# Patient Record
Sex: FEMALE | Race: WHITE | NOT HISPANIC OR LATINO | Employment: OTHER | URBAN - NONMETROPOLITAN AREA
[De-identification: names, ages, dates, MRNs, and addresses within clinical notes are randomized per-mention and may not be internally consistent; named-entity substitution may affect disease eponyms.]

---

## 2017-01-20 ENCOUNTER — OFFICE VISIT (OUTPATIENT)
Dept: CARDIOLOGY | Facility: CLINIC | Age: 62
End: 2017-01-20

## 2017-01-20 VITALS
HEIGHT: 62 IN | DIASTOLIC BLOOD PRESSURE: 78 MMHG | BODY MASS INDEX: 25.32 KG/M2 | HEART RATE: 80 BPM | OXYGEN SATURATION: 98 % | SYSTOLIC BLOOD PRESSURE: 128 MMHG | WEIGHT: 137.6 LBS

## 2017-01-20 DIAGNOSIS — I25.119 CORONARY ARTERY DISEASE INVOLVING NATIVE CORONARY ARTERY OF NATIVE HEART WITH ANGINA PECTORIS (HCC): ICD-10-CM

## 2017-01-20 DIAGNOSIS — R07.2 PRECORDIAL PAIN: Primary | ICD-10-CM

## 2017-01-20 DIAGNOSIS — R94.39 ABNORMAL STRESS TEST: ICD-10-CM

## 2017-01-20 PROCEDURE — 93000 ELECTROCARDIOGRAM COMPLETE: CPT | Performed by: PHYSICIAN ASSISTANT

## 2017-01-20 PROCEDURE — 99213 OFFICE O/P EST LOW 20 MIN: CPT | Performed by: PHYSICIAN ASSISTANT

## 2017-01-20 NOTE — PROGRESS NOTES
Problem list     Subjective   Janice Disla is a 61 y.o. female     Chief Complaint   Patient presents with   • Follow-up     6 mo.   Problem List:  1.) CAD  1.1) Stenting of the RCA and LAD per cath, 3-2012  1.2) Residual 30-50% circumflex and diagonal stenosis noted at time of cath with medical management recommended  1.3) Low risk stress test in 7-2013  2.) Preserved systolic function  3.) HTN  4.) Dyslipidemia      5.) Rheumatoid arthritis       HPI  The patient presents today for routine evaluation and follow-up.  We had last seen her to review and abnormal stress test finding.  She had a stress test from November 2015 which had suggested distal anterior and anteroapical ischemia.  She was doing well at that time and we did not pursue catheterization.  Recently, she is started noticing increasing episodes of chest tightness and pain.  She has occasional referral through to the mid back.  She has occasional referral to the neck.  She has noted that her dyspnea has been rather marked as of recent.  It limits activity.  This is concerning for her because these are very similar what she had with prior stenting procedures.  She has no palpitations.  She denies dizziness or syncope.  She has no further complaints otherwise at this time.    Current Outpatient Prescriptions   Medication Sig Dispense Refill   • Adalimumab 40 MG/0.8ML Prefilled Syringe Kit Inject  under the skin.     • aspirin 81 MG tablet Take 1 tablet by mouth daily.     • Cholecalciferol (VITAMIN D-3) 1000 UNITS capsule Take 1 capsule by mouth daily.     • colesevelam (WELCHOL) 625 MG tablet Take 3 tablets by mouth 2 (two) times a day.     • CRESTOR 10 MG tablet Take 1 tablet by mouth Daily. 30 tablet 3   • cycloSPORINE (RESTASIS) 0.05 % ophthalmic emulsion Apply  to eye 2 (two) times a day. Instill 1 drop in each eye twice daily prn     • hydroxychloroquine (PLAQUENIL) 200 MG tablet Take 1 tablet by mouth 2 (two) times a day.     • naproxen  (NAPROSYN) 500 MG tablet Take 1 tablet by mouth 2 (two) times a day.     • Polyethylene Glycol 3350 (MIRALAX PO) Take  by mouth daily. Mix 1 capful (17gm) in 8 ounces of water , juice or tea and drink daily     • predniSONE (DELTASONE) 1 MG tablet Take 1 tablet by mouth daily.     • Ticagrelor (BRILINTA) 60 MG tablet Take 1 tablet by mouth 2 (two) times a day. 180 tablet 3     No current facility-administered medications for this visit.        Codeine; Penicillins; and Sulfa antibiotics    Past Medical History   Diagnosis Date   • Coronary artery disease    • Hyperlipidemia    • Hypertension    • Rheumatoid arthritis        Social History     Social History   • Marital status:      Spouse name: N/A   • Number of children: N/A   • Years of education: N/A     Occupational History   • Not on file.     Social History Main Topics   • Smoking status: Never Smoker   • Smokeless tobacco: Never Used   • Alcohol use Yes      Comment: socially   • Drug use: No   • Sexual activity: Not on file     Other Topics Concern   • Not on file     Social History Narrative       Family History   Problem Relation Age of Onset   • Other Mother      CABG   • Coronary artery disease Mother    • Hyperlipidemia Mother    • Rheum arthritis Mother    • Other Sister      CABG   • Coronary artery disease Sister    • Diabetes Sister    • Hyperlipidemia Sister    • Other Other      CABG   • Coronary artery disease Other    • Other Other      CABG   • Coronary artery disease Other    • Diabetes Other    • Hyperlipidemia Other    • Rheum arthritis Other        Review of Systems   Constitutional: Positive for fatigue.   HENT: Positive for postnasal drip, sneezing and tinnitus.    Eyes: Positive for visual disturbance (wears glasses).   Respiratory: Positive for cough and shortness of breath.    Cardiovascular: Positive for chest pain and palpitations. Negative for leg swelling.   Gastrointestinal: Negative.    Endocrine: Negative.   "  Genitourinary: Negative.    Musculoskeletal: Positive for arthralgias (rheumatoid) and myalgias.   Skin: Negative.    Allergic/Immunologic: Positive for environmental allergies.   Neurological: Positive for dizziness.   Hematological: Bruises/bleeds easily.   Psychiatric/Behavioral: Positive for sleep disturbance.       Objective   Visit Vitals   • /78 (BP Location: Left arm, Patient Position: Sitting)   • Pulse 80   • Ht 62\" (157.5 cm)   • Wt 137 lb 9.6 oz (62.4 kg)   • SpO2 98%   • BMI 25.17 kg/m2     Lab Results (most recent)     None        Physical Exam   Constitutional: She is oriented to person, place, and time. She appears well-developed and well-nourished. No distress.   HENT:   Head: Normocephalic and atraumatic.   Eyes: Conjunctivae and EOM are normal. Pupils are equal, round, and reactive to light.   Neck: Normal range of motion. Neck supple. No JVD present. No tracheal deviation present.   Cardiovascular: Normal rate, regular rhythm, normal heart sounds and intact distal pulses.    Pulmonary/Chest: Effort normal and breath sounds normal.   Abdominal: Soft. Bowel sounds are normal. She exhibits no distension and no mass. There is no tenderness. There is no rebound and no guarding.   Musculoskeletal: Normal range of motion. She exhibits no edema, tenderness or deformity.   Neurological: She is alert and oriented to person, place, and time.   Skin: Skin is warm and dry. No rash noted. No erythema. No pallor.   Psychiatric: She has a normal mood and affect. Her behavior is normal. Judgment and thought content normal.   Nursing note and vitals reviewed.        Procedure     ECG 12 Lead  Date/Time: 1/20/2017 9:35 AM  Performed by: KALIN CASTELLON  Authorized by: KALIN CASTELLON   Comments: Sinus rhythm, normal axis, no acute changes noted.               Assessment/Plan      Diagnosis Plan   1. Precordial pain  ECG 12 Lead   2. Coronary artery disease involving native coronary artery of native heart " with angina pectoris     3. Abnormal stress test       The patient is started noticing chest pain concerning for ischemia.  Her dyspnea is far worse.  I'm concerned is both represented her prior angina/anginal equivalent symptoms.  Her prior stress test indicated anterior and anteroapical ischemia as above.  We try to treat her medically and follow her clinically.  With breakthrough symptoms however I feel that she needs consideration for catheterization for more definitive evaluation of her coronary anatomy.  I will make no changes medications for now.  She's not sure she is on Brilinta 60 mg twice a day or 90 mg twice a day.  She will call me back on that.  Obviously, she will need to be on 90 mg twice a day prior to catheterization.  Further recommendations to be made post catheterization pending findings.

## 2017-01-20 NOTE — MR AVS SNAPSHOT
Janice Disla   1/20/2017 9:00 AM   Office Visit    Dept Phone:  [phone]   Encounter #:  [id number]    Provider:  LEO Bess   Department:  [facility] CARDIOLOGY                Your Full Care Plan              Today's Medication Changes          These changes are accurate as of: 1/20/17 10:13 AM.  If you have any questions, ask your nurse or doctor.               Stop taking medication(s)listed here:     cetirizine 10 MG tablet   Commonly known as:  zyrTEC   Stopped by:  LEO Bess                      Your Updated Medication List          This list is accurate as of: 1/20/17 10:13 AM.  Always use your most recent med list.                adalimumab 40 MG/0.8ML Prefilled Syringe Kit injection   Commonly known as:  HUMIRA       aspirin 81 MG tablet       colesevelam 625 MG tablet   Commonly known as:  WELCHOL       CRESTOR 10 MG tablet   Generic drug:  rosuvastatin   Take 1 tablet by mouth Daily.       cycloSPORINE 0.05 % ophthalmic emulsion   Commonly known as:  RESTASIS       hydroxychloroquine 200 MG tablet   Commonly known as:  PLAQUENIL       MIRALAX PO       naproxen 500 MG tablet   Commonly known as:  NAPROSYN       predniSONE 1 MG tablet   Commonly known as:  DELTASONE       ticagrelor 60 MG tablet tablet   Commonly known as:  BRILINTA   Take 1 tablet by mouth 2 (two) times a day.       Vitamin D-3 1000 UNITS capsule               We Performed the Following     Cardiac catheterization     ECG 12 Lead       You Were Diagnosed With        Codes Comments    Precordial pain    -  Primary ICD-10-CM: R07.2  ICD-9-CM: 786.51     Coronary artery disease involving native coronary artery of native heart with angina pectoris     ICD-10-CM: I25.119  ICD-9-CM: 414.01, 413.9     Abnormal stress test     ICD-10-CM: R94.39  ICD-9-CM: 794.39       Instructions     None    Patient Instructions History      Upcoming Appointments     Visit Type Date Time  "Department    FOLLOW UP 2017  9:00 AM [facility] [employer] Signup     [facility] [employer] allows you to send messages to your doctor, view your test results, renew your prescriptions, schedule appointments, and more. To sign up, go to [employer] and click on the Sign Up Now link in the New User? box. Enter your Executive [employer] Activation Code exactly as it appears below along with the last four digits of your Social Security Number and your Date of Birth () to complete the sign-up process. If you do not sign up before the expiration date, you must request a new code.    Executive [employer] Activation Code: [email or web address]  Expires: 2/3/2017 10:12 AM    If you have questions, you can email [email or web address] or call [phone] to talk to our [employer] staff. Remember, [employer] is NOT to be used for urgent needs. For medical emergencies, dial 911.               Other Info from Your Visit           Allergies     Codeine      Penicillins      Sulfa Antibiotics        Reason for Visit     Follow-up 6 mo.      Vital Signs     Blood Pressure Pulse Height Weight Oxygen Saturation Body Mass Index    128/78 (BP Location: Left arm, Patient Position: Sitting) 80 62\" (157.5 cm) 137 lb 9.6 oz (62.4 kg) 98% 25.17 kg/m2    Smoking Status                   Never Smoker           Problems and Diagnoses Noted     Precordial chest pain    -  Primary    Coronary artery disease involving native coronary artery of native heart with angina pectoris        Abnormal stress test          Results     ECG 12 Lead               "

## 2017-01-31 DIAGNOSIS — E78.5 DYSLIPIDEMIA: ICD-10-CM

## 2017-01-31 RX ORDER — ROSUVASTATIN CALCIUM 10 MG/1
10 TABLET, FILM COATED ORAL DAILY
Qty: 30 TABLET | Refills: 3 | Status: SHIPPED | OUTPATIENT
Start: 2017-01-31 | End: 2017-03-09 | Stop reason: SDUPTHER

## 2017-02-03 ENCOUNTER — OUTSIDE FACILITY SERVICE (OUTPATIENT)
Dept: CARDIOLOGY | Facility: CLINIC | Age: 62
End: 2017-02-03

## 2017-02-03 PROCEDURE — 93458 L HRT ARTERY/VENTRICLE ANGIO: CPT | Performed by: INTERNAL MEDICINE

## 2017-02-16 ENCOUNTER — OFFICE VISIT (OUTPATIENT)
Dept: CARDIOLOGY | Facility: CLINIC | Age: 62
End: 2017-02-16

## 2017-02-16 VITALS
DIASTOLIC BLOOD PRESSURE: 79 MMHG | WEIGHT: 139.8 LBS | HEART RATE: 80 BPM | HEIGHT: 62 IN | OXYGEN SATURATION: 100 % | SYSTOLIC BLOOD PRESSURE: 124 MMHG | BODY MASS INDEX: 25.73 KG/M2

## 2017-02-16 DIAGNOSIS — I10 ESSENTIAL HYPERTENSION: ICD-10-CM

## 2017-02-16 DIAGNOSIS — R06.02 SHORTNESS OF BREATH: ICD-10-CM

## 2017-02-16 DIAGNOSIS — I25.10 CORONARY ARTERIOSCLEROSIS IN NATIVE ARTERY: Primary | ICD-10-CM

## 2017-02-16 PROCEDURE — 99213 OFFICE O/P EST LOW 20 MIN: CPT | Performed by: PHYSICIAN ASSISTANT

## 2017-02-16 NOTE — PROGRESS NOTES
Problem list     Subjective   Janice Disla is a 61 y.o. female     Chief Complaint   Patient presents with   • Follow-up     presents as a follow up   Problem List:  1.) CAD  1.1) Stenting of the RCA and LAD per cath, 3-2012  1.2) Repeat [facility], 02/2017, in the setting of low-level symptoms and abnormal stress test.  Catheterization indicated patent stents, moderate disease of the LAD actually improved from time of catheterization in 2012, and significant ostial diagonal stenosis not felt an appropriate target percutaneously.  Medical management was recommended.  2.) Preserved systolic function  3.) HTN  4.) Dyslipidemia      5.) Rheumatoid arthritis    HPI  The patient presents back for follow-up post catheterization.  She had catheterization in February, with results as outlined above.  She continues to have a degree of chest pain and shortness of air which if anything has improved since time of catheterization.  She is scheduled to see her primary care provider soon to evaluate for noncardiac etiologies of symptoms.  The patient has no PND or orthopnea.  She relates to no symptoms of dysrhythmias.  Blood pressures have been controlled.  She is tolerating current medical regimen without complication.  She has no further complaints or complications otherwise.  She feels that she is doing very well from cardiac standpoint for the most part.    Current Outpatient Prescriptions   Medication Sig Dispense Refill   • Adalimumab 40 MG/0.8ML Prefilled Syringe Kit Inject  under the skin.     • aspirin 81 MG tablet Take 1 tablet by mouth daily.     • Cholecalciferol (VITAMIN D-3) 1000 UNITS capsule Take 1 capsule by mouth daily.     • colesevelam (WELCHOL) 625 MG tablet Take 3 tablets by mouth 2 (two) times a day.     • CRESTOR 10 MG tablet Take 1 tablet by mouth Daily. 30 tablet 3   • cycloSPORINE (RESTASIS) 0.05 % ophthalmic emulsion Apply  to eye 2 (two) times a day. Instill 1 drop in each eye twice daily prn     •  hydroxychloroquine (PLAQUENIL) 200 MG tablet Take 1 tablet by mouth 2 (two) times a day.     • naproxen (NAPROSYN) 500 MG tablet Take 1 tablet by mouth 2 (two) times a day.     • Polyethylene Glycol 3350 (MIRALAX PO) Take  by mouth daily. Mix 1 capful (17gm) in 8 ounces of water , juice or tea and drink daily     • predniSONE (DELTASONE) 1 MG tablet Take 1 tablet by mouth daily.       No current facility-administered medications for this visit.        Codeine; Penicillins; and Sulfa antibiotics    Past Medical History   Diagnosis Date   • Coronary artery disease    • Hyperlipidemia    • Hypertension    • Rheumatoid arthritis        Social History     Social History   • Marital status:      Spouse name: N/A   • Number of children: N/A   • Years of education: N/A     Occupational History   • Not on file.     Social History Main Topics   • Smoking status: Never Smoker   • Smokeless tobacco: Never Used   • Alcohol use Yes      Comment: socially   • Drug use: No   • Sexual activity: Not on file     Other Topics Concern   • Not on file     Social History Narrative       Family History   Problem Relation Age of Onset   • Other Mother      CABG   • Coronary artery disease Mother    • Hyperlipidemia Mother    • Rheum arthritis Mother    • Other Sister      CABG   • Coronary artery disease Sister    • Diabetes Sister    • Hyperlipidemia Sister    • Other Other      CABG   • Coronary artery disease Other    • Other Other      CABG   • Coronary artery disease Other    • Diabetes Other    • Hyperlipidemia Other    • Rheum arthritis Other        Review of Systems   Constitutional: Positive for fatigue.   HENT: Positive for sinus pressure and sore throat (dry mouth).    Eyes: Positive for visual disturbance.   Respiratory: Positive for shortness of breath.    Cardiovascular: Positive for chest pain. Negative for palpitations and leg swelling.   Gastrointestinal: Negative.    Endocrine: Negative.    Genitourinary: Negative.   "  Musculoskeletal: Positive for arthralgias (RA).   Skin: Negative.    Allergic/Immunologic: Positive for environmental allergies.   Neurological: Negative.    Hematological: Bruises/bleeds easily.   Psychiatric/Behavioral: The patient is nervous/anxious.        Objective   Visit Vitals   • /79 (BP Location: Left arm, Patient Position: Sitting)   • Pulse 80   • Ht 62\" (157.5 cm)   • Wt 139 lb 12.8 oz (63.4 kg)   • SpO2 100%   • BMI 25.57 kg/m2     Lab Results (most recent)     None        Physical Exam   Constitutional: She is oriented to person, place, and time. She appears well-developed and well-nourished. No distress.   HENT:   Head: Normocephalic and atraumatic.   Eyes: Conjunctivae and EOM are normal. Pupils are equal, round, and reactive to light.   Neck: Normal range of motion. Neck supple. No JVD present. No tracheal deviation present.   Cardiovascular: Normal rate, regular rhythm, normal heart sounds and intact distal pulses.    Pulmonary/Chest: Effort normal and breath sounds normal.   Abdominal: Soft. Bowel sounds are normal. She exhibits no distension and no mass. There is no tenderness. There is no rebound and no guarding.   Musculoskeletal: Normal range of motion. She exhibits no edema, tenderness or deformity.   Neurological: She is alert and oriented to person, place, and time.   Skin: Skin is warm and dry. No rash noted. No erythema. No pallor.   Psychiatric: She has a normal mood and affect. Her behavior is normal. Judgment and thought content normal.   Nursing note and vitals reviewed.        Procedure   Procedures       Assessment/Plan      Diagnosis Plan   1. Coronary arteriosclerosis in native artery     2. Essential hypertension     3. Shortness of breath       The patient seems to be doing well at this time.  Catheterization indicated stable findings, which we will continue to manage medically.  Nothing further from cardiac standpoint at this time.  She will call for any issues.  " Otherwise we'll see her back in 6 months.

## 2017-03-09 DIAGNOSIS — E78.5 DYSLIPIDEMIA: ICD-10-CM

## 2017-03-09 RX ORDER — ROSUVASTATIN CALCIUM 10 MG/1
10 TABLET, FILM COATED ORAL DAILY
Qty: 30 TABLET | Refills: 6 | Status: SHIPPED | OUTPATIENT
Start: 2017-03-09 | End: 2017-06-06 | Stop reason: SDUPTHER

## 2017-06-06 DIAGNOSIS — E78.5 DYSLIPIDEMIA: ICD-10-CM

## 2017-06-06 RX ORDER — ROSUVASTATIN CALCIUM 10 MG/1
10 TABLET, FILM COATED ORAL DAILY
Qty: 30 TABLET | Refills: 6 | Status: SHIPPED | OUTPATIENT
Start: 2017-06-06 | End: 2017-12-13 | Stop reason: SDUPTHER

## 2017-08-11 ENCOUNTER — TRANSCRIBE ORDERS (OUTPATIENT)
Dept: MAMMOGRAPHY | Facility: HOSPITAL | Age: 62
End: 2017-08-11

## 2017-08-11 DIAGNOSIS — N64.4 PAINFUL BREASTS: Primary | ICD-10-CM

## 2017-08-17 ENCOUNTER — HOSPITAL ENCOUNTER (OUTPATIENT)
Dept: ULTRASOUND IMAGING | Facility: HOSPITAL | Age: 62
Discharge: HOME | End: 2017-08-17

## 2017-08-17 ENCOUNTER — HOSPITAL ENCOUNTER (OUTPATIENT)
Dept: MAMMOGRAPHY | Facility: HOSPITAL | Age: 62
Discharge: HOME | End: 2017-08-17
Admitting: NURSE PRACTITIONER

## 2017-08-17 DIAGNOSIS — N64.4 PAINFUL BREASTS: ICD-10-CM

## 2017-08-17 PROCEDURE — 76642 ULTRASOUND BREAST LIMITED: CPT | Performed by: RADIOLOGY

## 2017-08-17 PROCEDURE — 77062 BREAST TOMOSYNTHESIS BI: CPT | Performed by: RADIOLOGY

## 2017-08-17 PROCEDURE — 76642 ULTRASOUND BREAST LIMITED: CPT

## 2017-08-17 PROCEDURE — 77066 DX MAMMO INCL CAD BI: CPT | Performed by: RADIOLOGY

## 2017-08-17 PROCEDURE — G0204 DX MAMMO INCL CAD BI: HCPCS

## 2017-08-17 PROCEDURE — G0279 TOMOSYNTHESIS, MAMMO: HCPCS

## 2017-09-01 ENCOUNTER — OFFICE VISIT (OUTPATIENT)
Dept: CARDIOLOGY | Facility: CLINIC | Age: 62
End: 2017-09-01

## 2017-09-01 VITALS
HEIGHT: 62 IN | OXYGEN SATURATION: 99 % | BODY MASS INDEX: 25.91 KG/M2 | SYSTOLIC BLOOD PRESSURE: 133 MMHG | DIASTOLIC BLOOD PRESSURE: 83 MMHG | HEART RATE: 76 BPM | WEIGHT: 140.8 LBS

## 2017-09-01 DIAGNOSIS — I25.10 CORONARY ARTERIOSCLEROSIS IN NATIVE ARTERY: Primary | ICD-10-CM

## 2017-09-01 DIAGNOSIS — R06.02 SHORTNESS OF BREATH: ICD-10-CM

## 2017-09-01 PROCEDURE — 99213 OFFICE O/P EST LOW 20 MIN: CPT | Performed by: PHYSICIAN ASSISTANT

## 2017-09-01 RX ORDER — PANTOPRAZOLE SODIUM 40 MG/1
40 TABLET, DELAYED RELEASE ORAL DAILY
Refills: 5 | COMMUNITY
Start: 2017-07-29

## 2017-09-01 NOTE — PROGRESS NOTES
Problem list     Subjective   Janice Disla is a 61 y.o. female     Chief Complaint   Patient presents with   • Follow-up     patient appears in office today for 6 month follow up ; patient denies any CP/SOB/Palpitations at this time    Problem List:  1.) CAD  1.1) Stenting of the RCA and LAD per cath, 3-2012  1.2) Repeat [facility], 02/2017, in the setting of low-level symptoms and abnormal stress test.  Catheterization indicated patent stents, moderate disease of the LAD actually improved from time of catheterization in 2012, and significant ostial diagonal stenosis not felt an appropriate target percutaneously.  Medical management was recommended.  2.) Preserved systolic function  3.) HTN  4.) Dyslipidemia      5.) Rheumatoid arthritis    HPI  The patient presents in for routine evaluation follow-up since her last appointment here, she reports that she is done very well.  She denies current chest pain.  She has stable dyspnea.  She relates to no PND orthopnea.  She reports no rhythm disturbance issues.  Blood pressures of been controlled when checked at home.  Labs are followed through her primary care provider and reported as normal by the patient.  She has no further complaints otherwise and feels that she is doing well.    Current Outpatient Prescriptions   Medication Sig Dispense Refill   • Adalimumab 40 MG/0.8ML Prefilled Syringe Kit Inject  under the skin.     • aspirin 81 MG tablet Take 81 mg by mouth Daily.     • Cholecalciferol (VITAMIN D-3) 1000 UNITS capsule Take 1 capsule by mouth daily.     • colesevelam (WELCHOL) 625 MG tablet Take 3 tablets by mouth 2 (two) times a day.     • CRESTOR 10 MG tablet Take 1 tablet by mouth Daily. 30 tablet 6   • cycloSPORINE (RESTASIS) 0.05 % ophthalmic emulsion Apply  to eye 2 (two) times a day. Instill 1 drop in each eye twice daily prn     • hydroxychloroquine (PLAQUENIL) 200 MG tablet Take 1 tablet by mouth 2 (two) times a day.     • pantoprazole (PROTONIX) 40 MG EC  tablet Take 40 mg by mouth Daily.  5   • Polyethylene Glycol 3350 (MIRALAX PO) Take  by mouth daily. Mix 1 capful (17gm) in 8 ounces of water , juice or tea and drink daily     • predniSONE (DELTASONE) 1 MG tablet Take 1 tablet by mouth daily.       No current facility-administered medications for this visit.        Codeine; Penicillins; and Sulfa antibiotics    Past Medical History:   Diagnosis Date   • Coronary artery disease    • Hyperlipidemia    • Hypertension    • Rheumatoid arthritis        Social History     Social History   • Marital status:      Spouse name: N/A   • Number of children: N/A   • Years of education: N/A     Occupational History   • Not on file.     Social History Main Topics   • Smoking status: Never Smoker   • Smokeless tobacco: Never Used   • Alcohol use Yes      Comment: socially   • Drug use: No   • Sexual activity: Not on file     Other Topics Concern   • Not on file     Social History Narrative       Family History   Problem Relation Age of Onset   • Other Mother      CABG   • Coronary artery disease Mother    • Hyperlipidemia Mother    • Rheum arthritis Mother    • Other Sister      CABG   • Coronary artery disease Sister    • Diabetes Sister    • Hyperlipidemia Sister    • Other Other      CABG   • Coronary artery disease Other    • Other Other      CABG   • Coronary artery disease Other    • Diabetes Other    • Hyperlipidemia Other    • Rheum arthritis Other        Review of Systems   Constitutional: Negative.  Negative for fatigue.   HENT: Negative.    Eyes: Positive for visual disturbance (wears glasses daily).   Respiratory: Positive for shortness of breath (on exertion only). Negative for cough, chest tightness and wheezing.    Cardiovascular: Negative.  Negative for chest pain (denies CP), palpitations (denies palpitations) and leg swelling.   Gastrointestinal: Negative.  Negative for abdominal pain, nausea and vomiting.   Endocrine: Negative.  Negative for cold  "intolerance, heat intolerance, polyphagia and polyuria.   Genitourinary: Negative.  Negative for difficulty urinating, frequency and urgency.   Musculoskeletal: Positive for arthralgias (H/O RA). Negative for back pain, myalgias, neck pain and neck stiffness.   Skin: Negative for rash and wound.   Allergic/Immunologic: Positive for environmental allergies (seasonal allergies). Negative for food allergies.   Neurological: Negative.  Negative for dizziness, weakness, light-headedness and headaches.   Hematological: Negative.  Does not bruise/bleed easily.   Psychiatric/Behavioral: Negative for agitation, confusion and sleep disturbance (denies waking up smothering). The patient is not nervous/anxious.        Objective   /83 (BP Location: Left arm, Patient Position: Sitting)  Pulse 76  Ht 62\" (157.5 cm)  Wt 140 lb 12.8 oz (63.9 kg)  SpO2 99%  BMI 25.75 kg/m2  Lab Results (most recent)     None        Physical Exam   Constitutional: She is oriented to person, place, and time. She appears well-developed and well-nourished. No distress.   HENT:   Head: Normocephalic and atraumatic.   Eyes: Conjunctivae and EOM are normal. Pupils are equal, round, and reactive to light.   Neck: Normal range of motion. Neck supple. No JVD present. No tracheal deviation present.   Cardiovascular: Normal rate, regular rhythm, normal heart sounds and intact distal pulses.    Pulmonary/Chest: Effort normal and breath sounds normal.   Abdominal: Soft. Bowel sounds are normal. She exhibits no distension and no mass. There is no tenderness. There is no rebound and no guarding.   Musculoskeletal: Normal range of motion. She exhibits no edema, tenderness or deformity.   Neurological: She is alert and oriented to person, place, and time.   Skin: Skin is warm and dry. No rash noted. No erythema. No pallor.   Psychiatric: She has a normal mood and affect. Her behavior is normal. Judgment and thought content normal.   Nursing note and vitals " reviewed.        Procedure   Procedures       Assessment/Plan      Diagnosis Plan   1. Coronary arteriosclerosis in native artery     2. Shortness of breath       The patient seems to be doing well.  We reviewed That and previous echo findings with her today.  The patient had stable anatomy by catheterization.  She had stable findings by echocardiogram.  I feel nothing further is indicated.  The patient will call to us for any problems.  Otherwise, we will see her back in 6 months.

## 2017-12-13 DIAGNOSIS — E78.5 DYSLIPIDEMIA: ICD-10-CM

## 2017-12-13 RX ORDER — ROSUVASTATIN CALCIUM 10 MG/1
10 TABLET, FILM COATED ORAL DAILY
Qty: 90 TABLET | Refills: 3 | Status: SHIPPED | OUTPATIENT
Start: 2017-12-13 | End: 2018-10-30 | Stop reason: SDUPTHER

## 2018-05-04 ENCOUNTER — OFFICE VISIT (OUTPATIENT)
Dept: CARDIOLOGY | Facility: CLINIC | Age: 63
End: 2018-05-04

## 2018-05-04 VITALS
HEART RATE: 70 BPM | HEIGHT: 62 IN | WEIGHT: 135.8 LBS | SYSTOLIC BLOOD PRESSURE: 129 MMHG | DIASTOLIC BLOOD PRESSURE: 86 MMHG | OXYGEN SATURATION: 100 % | BODY MASS INDEX: 24.99 KG/M2

## 2018-05-04 DIAGNOSIS — I25.10 CORONARY ARTERY DISEASE INVOLVING NATIVE CORONARY ARTERY OF NATIVE HEART WITHOUT ANGINA PECTORIS: Primary | ICD-10-CM

## 2018-05-04 DIAGNOSIS — R06.02 SHORTNESS OF BREATH: ICD-10-CM

## 2018-05-04 DIAGNOSIS — R00.2 PALPITATIONS: ICD-10-CM

## 2018-05-04 PROCEDURE — 99213 OFFICE O/P EST LOW 20 MIN: CPT | Performed by: PHYSICIAN ASSISTANT

## 2018-05-04 PROCEDURE — 93000 ELECTROCARDIOGRAM COMPLETE: CPT | Performed by: PHYSICIAN ASSISTANT

## 2018-05-04 NOTE — PROGRESS NOTES
Problem list     Subjective   Janice Disla is a 62 y.o. female     Chief Complaint   Patient presents with   • Follow-up     patient appears in office today for 6 month follow up    • Coronary Artery Disease   Problem List:  1.) CAD  1.1) Stenting of the RCA and LAD per cath, 3-2012  1.2) Repeat [facility], 02/2017, in the setting of low-level symptoms and abnormal stress test.  Catheterization indicated patent stents, moderate disease of the LAD actually improved from time of catheterization in 2012, and significant ostial diagonal stenosis not felt an appropriate target percutaneously.  Medical management was recommended.  2.) Preserved systolic function  3.) HTN  4.) Dyslipidemia      5.) Rheumatoid arthritis    HPI  The patient present in for routine, 6 month follow-up.  At this time, she tells me that she is doing reasonably well.  She has chronic but stable dyspnea.  She denies chest pain.  She tells me that her blood pressures are slightly elevated today but typically normal when checked at home.  The patient has chronic but very minimal palpitations.  She has no sustained dysrhythmic activity.  She denies PND orthopnea.  We again reviewed previous catheter findings as above.  We will be managing her coronary artery disease medically at this time.  She again confirms with me that she has no anginal equivalent symptoms at this time.  She feels that she is doing well from cardiac standpoint.    Current Outpatient Prescriptions   Medication Sig Dispense Refill   • Adalimumab 40 MG/0.8ML Prefilled Syringe Kit Inject 40 mg under the skin Every 14 (Fourteen) Days.     • aspirin 81 MG tablet Take 81 mg by mouth Daily.     • Cholecalciferol (VITAMIN D-3) 1000 UNITS capsule Take 1 capsule by mouth daily.     • colesevelam (WELCHOL) 625 MG tablet Take 3 tablets by mouth 2 (two) times a day.     • CRESTOR 10 MG tablet Take 1 tablet by mouth Daily. 90 tablet 3   • cycloSPORINE (RESTASIS) 0.05 % ophthalmic emulsion Apply  to  eye 2 (two) times a day. Instill 1 drop in each eye twice daily prn     • hydroxychloroquine (PLAQUENIL) 200 MG tablet Take 1 tablet by mouth 2 (two) times a day.     • pantoprazole (PROTONIX) 40 MG EC tablet Take 40 mg by mouth Daily.  5   • Polyethylene Glycol 3350 (MIRALAX PO) Take  by mouth daily. Mix 1 capful (17gm) in 8 ounces of water , juice or tea and drink daily       No current facility-administered medications for this visit.        Codeine; Penicillins; and Sulfa antibiotics    Past Medical History:   Diagnosis Date   • Coronary artery disease    • Hyperlipidemia    • Hypertension    • Rheumatoid arthritis        Social History     Social History   • Marital status:      Spouse name: N/A   • Number of children: N/A   • Years of education: N/A     Occupational History   • Not on file.     Social History Main Topics   • Smoking status: Never Smoker   • Smokeless tobacco: Never Used   • Alcohol use Yes      Comment: socially   • Drug use: No   • Sexual activity: Defer     Other Topics Concern   • Not on file     Social History Narrative   • No narrative on file       Family History   Problem Relation Age of Onset   • Other Mother      CABG   • Coronary artery disease Mother    • Hyperlipidemia Mother    • Rheum arthritis Mother    • Other Sister      CABG   • Coronary artery disease Sister    • Diabetes Sister    • Hyperlipidemia Sister    • Other Other      CABG   • Coronary artery disease Other    • Other Other      CABG   • Coronary artery disease Other    • Diabetes Other    • Hyperlipidemia Other    • Rheum arthritis Other        Review of Systems   Constitutional: Negative.  Negative for fatigue.   HENT: Positive for congestion (head congestion due to allergies), rhinorrhea (runny nose due to allergies) and sneezing (sneezing due to allergies). Negative for sore throat.    Eyes: Positive for visual disturbance (wears glasses daily).   Respiratory: Positive for shortness of breath (with  "exertion only). Negative for apnea, cough, chest tightness and wheezing.    Cardiovascular: Negative.  Negative for chest pain (denies CP), palpitations (denies palpitations) and leg swelling.   Gastrointestinal: Negative.  Negative for abdominal distention, abdominal pain, nausea and vomiting.   Endocrine: Negative.  Negative for cold intolerance, heat intolerance, polyphagia and polyuria.   Genitourinary: Negative.  Negative for difficulty urinating, frequency and urgency.   Musculoskeletal: Positive for arthralgias (H/O RA). Negative for back pain, myalgias, neck pain and neck stiffness.   Skin: Negative.  Negative for rash and wound.   Allergic/Immunologic: Positive for environmental allergies (seasonal allergies). Negative for food allergies.   Neurological: Negative.  Negative for dizziness, weakness, light-headedness and headaches.   Hematological: Negative.  Does not bruise/bleed easily.   Psychiatric/Behavioral: Negative.  Negative for agitation, confusion and sleep disturbance (denies waking up smothering/SOA). The patient is not nervous/anxious.        Objective   Vitals:    05/04/18 0907   BP: 129/86   BP Location: Left arm   Patient Position: Sitting   Pulse: 70   SpO2: 100%   Weight: 61.6 kg (135 lb 12.8 oz)   Height: 157.5 cm (62\")      /86 (BP Location: Left arm, Patient Position: Sitting)   Pulse 70   Ht 157.5 cm (62\")   Wt 61.6 kg (135 lb 12.8 oz)   SpO2 100%   BMI 24.84 kg/m²    Lab Results (most recent)     None        Physical Exam   Constitutional: She is oriented to person, place, and time. She appears well-developed and well-nourished. No distress.   HENT:   Head: Normocephalic and atraumatic.   Eyes: Conjunctivae and EOM are normal. Pupils are equal, round, and reactive to light.   Neck: Normal range of motion. Neck supple. No JVD present. No tracheal deviation present.   Cardiovascular: Normal rate, regular rhythm, normal heart sounds and intact distal pulses.    Pulmonary/Chest: " Effort normal and breath sounds normal.   Abdominal: Soft. Bowel sounds are normal. She exhibits no distension and no mass. There is no tenderness. There is no rebound and no guarding.   Musculoskeletal: Normal range of motion. She exhibits no edema, tenderness or deformity.   Neurological: She is alert and oriented to person, place, and time.   Skin: Skin is warm and dry. No rash noted. No erythema. No pallor.   Psychiatric: She has a normal mood and affect. Her behavior is normal. Judgment and thought content normal.   Nursing note and vitals reviewed.        Procedure     ECG 12 Lead  Date/Time: 5/4/2018 9:12 AM  Performed by: KALIN CASTELLON  Authorized by: KALIN CASTELLON   Comments: CAD    Sinus rhythm at 66, normal axis, minor nonspecific ST and T-wave changes, no acute changes noted.               Assessment/Plan      Diagnosis Plan   1. Coronary artery disease involving native coronary artery of native heart without angina pectoris  ECG 12 Lead   2. Shortness of breath     3. Palpitations         The patient is stable at this time.  She has nonobstructive coronary artery disease which we will be managing medically.  I will make no changes in medications at this time.  For any complications, she will call to us.  Otherwise, we will see her back in 6 months.         Patient's Body mass index is 24.84 kg/m². BMI is above normal parameters. Follow-up plan includes:  educational material and referral to primary care.             Electronically signed by:

## 2018-05-04 NOTE — PATIENT INSTRUCTIONS

## 2018-08-17 ENCOUNTER — TRANSCRIBE ORDERS (OUTPATIENT)
Dept: ADMINISTRATIVE | Facility: HOSPITAL | Age: 63
End: 2018-08-17

## 2018-08-17 DIAGNOSIS — Z12.31 VISIT FOR SCREENING MAMMOGRAM: Primary | ICD-10-CM

## 2018-08-31 ENCOUNTER — HOSPITAL ENCOUNTER (OUTPATIENT)
Dept: MAMMOGRAPHY | Facility: HOSPITAL | Age: 63
Discharge: HOME | End: 2018-08-31
Admitting: OBSTETRICS & GYNECOLOGY

## 2018-08-31 DIAGNOSIS — Z12.31 VISIT FOR SCREENING MAMMOGRAM: ICD-10-CM

## 2018-08-31 PROCEDURE — 77067 SCR MAMMO BI INCL CAD: CPT | Performed by: RADIOLOGY

## 2018-08-31 PROCEDURE — 77063 BREAST TOMOSYNTHESIS BI: CPT

## 2018-08-31 PROCEDURE — 77067 SCR MAMMO BI INCL CAD: CPT

## 2018-08-31 PROCEDURE — 77063 BREAST TOMOSYNTHESIS BI: CPT | Performed by: RADIOLOGY

## 2018-10-30 DIAGNOSIS — E78.5 DYSLIPIDEMIA: ICD-10-CM

## 2018-10-30 RX ORDER — ROSUVASTATIN CALCIUM 10 MG/1
10 TABLET, FILM COATED ORAL DAILY
Qty: 90 TABLET | Refills: 3 | Status: SHIPPED | OUTPATIENT
Start: 2018-10-30 | End: 2019-01-15 | Stop reason: SDUPTHER

## 2018-10-30 NOTE — TELEPHONE ENCOUNTER
Received refill request from [facility] for RF of pt's rosuvastatin 10mg tabs. Per chart review, pt has been seen within the last year and has follow up appt set up. Sending in RF, per protocol.

## 2018-11-09 ENCOUNTER — OFFICE VISIT (OUTPATIENT)
Dept: CARDIOLOGY | Facility: CLINIC | Age: 63
End: 2018-11-09

## 2018-11-09 VITALS
SYSTOLIC BLOOD PRESSURE: 120 MMHG | BODY MASS INDEX: 23.92 KG/M2 | HEART RATE: 85 BPM | HEIGHT: 62 IN | DIASTOLIC BLOOD PRESSURE: 81 MMHG | WEIGHT: 130 LBS | OXYGEN SATURATION: 98 %

## 2018-11-09 DIAGNOSIS — I25.10 CORONARY ARTERIOSCLEROSIS IN NATIVE ARTERY: Primary | ICD-10-CM

## 2018-11-09 DIAGNOSIS — I10 ESSENTIAL HYPERTENSION: ICD-10-CM

## 2018-11-09 DIAGNOSIS — R06.02 SHORTNESS OF BREATH: ICD-10-CM

## 2018-11-09 PROCEDURE — 93000 ELECTROCARDIOGRAM COMPLETE: CPT | Performed by: PHYSICIAN ASSISTANT

## 2018-11-09 PROCEDURE — 99213 OFFICE O/P EST LOW 20 MIN: CPT | Performed by: PHYSICIAN ASSISTANT

## 2018-11-09 NOTE — PROGRESS NOTES
Problem list     Subjective   Janice Disla is a 63 y.o. female     Chief Complaint   Patient presents with   • Follow-up     presents for 6 month f/u   • Coronary Artery Disease   Problem List:  1.) CAD  1.1) Stenting of the RCA and LAD per cath, 3-2012  1.2) Repeat [facility], 02/2017, in the setting of low-level symptoms and abnormal stress test.  Catheterization indicated patent stents, moderate disease of the LAD actually improved from time of catheterization in 2012, and significant ostial diagonal stenosis not felt an appropriate target percutaneously.  Medical management was recommended.  2.) Preserved systolic function  3.) HTN  4.) Dyslipidemia      5.) Rheumatoid arthritis    HPI  The patient presents back in today for routine, 6 month follow-up.  Since last appointment here, she is continued to do well from cardiovascular standpoint.  She had an episode of midepigastric discomfort last evening which she recognizes reflux related symptoms.  She has had no chest pain compatible with were consistent with ischemia since her last appointment here however.  She reports stable dyspnea.  She has no PND or orthopnea.  Blood pressures remained well controlled.  Last labs suggested normal lipid parameters by report.  Laboratories were remarkable otherwise.  She has no further complaints otherwise and feels that she is doing well from cardiovascular standpoint.    Current Outpatient Prescriptions   Medication Sig Dispense Refill   • Adalimumab 40 MG/0.8ML Prefilled Syringe Kit Inject 40 mg under the skin Every 14 (Fourteen) Days.     • aspirin 81 MG tablet Take 81 mg by mouth Daily.     • Cholecalciferol (VITAMIN D-3) 1000 UNITS capsule Take 1 capsule by mouth daily.     • colesevelam (WELCHOL) 625 MG tablet Take 3 tablets by mouth 2 (two) times a day.     • CRESTOR 10 MG tablet Take 1 tablet by mouth Daily. 90 tablet 3   • cycloSPORINE (RESTASIS) 0.05 % ophthalmic emulsion Apply  to eye 2 (two) times a day. Instill 1  drop in each eye twice daily prn     • hydroxychloroquine (PLAQUENIL) 200 MG tablet Take 1 tablet by mouth 2 (two) times a day.     • pantoprazole (PROTONIX) 40 MG EC tablet Take 40 mg by mouth Daily.  5   • Polyethylene Glycol 3350 (MIRALAX PO) Take  by mouth daily. Mix 1 capful (17gm) in 8 ounces of water , juice or tea and drink daily       No current facility-administered medications for this visit.        Codeine; Penicillins; and Sulfa antibiotics    Past Medical History:   Diagnosis Date   • Coronary artery disease    • Hyperlipidemia    • Hypertension    • Rheumatoid arthritis (CMS/HCC)        Social History     Social History   • Marital status:      Spouse name: N/A   • Number of children: N/A   • Years of education: N/A     Occupational History   • Not on file.     Social History Main Topics   • Smoking status: Never Smoker   • Smokeless tobacco: Never Used   • Alcohol use Yes      Comment: socially   • Drug use: No   • Sexual activity: Defer     Other Topics Concern   • Not on file     Social History Narrative   • No narrative on file       Family History   Problem Relation Age of Onset   • Other Mother         CABG   • Coronary artery disease Mother    • Hyperlipidemia Mother    • Rheum arthritis Mother    • Other Sister         CABG   • Coronary artery disease Sister    • Diabetes Sister    • Hyperlipidemia Sister    • Other Other         CABG   • Coronary artery disease Other    • Other Other         CABG   • Coronary artery disease Other    • Diabetes Other    • Hyperlipidemia Other    • Rheum arthritis Other    • Breast cancer Neg Hx    • Ovarian cancer Neg Hx        Review of Systems   Constitutional: Negative.  Negative for chills, diaphoresis, fatigue and fever.   HENT: Positive for sinus pain, sinus pressure, tinnitus and trouble swallowing. Negative for congestion, nosebleeds, postnasal drip, rhinorrhea, sneezing and sore throat.    Eyes: Positive for visual disturbance (wears glasses).  "  Respiratory: Negative.  Negative for apnea, chest tightness, shortness of breath and wheezing.    Cardiovascular: Negative.  Negative for chest pain (indigestion), palpitations and leg swelling.   Gastrointestinal: Negative.  Negative for abdominal pain, blood in stool, constipation, diarrhea, nausea and vomiting.   Endocrine: Negative.    Genitourinary: Negative for hematuria.   Musculoskeletal: Positive for arthralgias, back pain, myalgias and neck pain.   Skin: Negative.  Negative for rash and wound.   Allergic/Immunologic: Positive for environmental allergies. Negative for food allergies.   Neurological: Negative.  Negative for dizziness, syncope, weakness, light-headedness, numbness and headaches.   Hematological: Negative.  Does not bruise/bleed easily.   Psychiatric/Behavioral: Positive for sleep disturbance (insomnia ). Negative for agitation. The patient is not nervous/anxious.        Objective   Vitals:    11/09/18 0918   BP: 120/81   BP Location: Left arm   Patient Position: Sitting   Pulse: 85   SpO2: 98%   Weight: 59 kg (130 lb)   Height: 157.5 cm (62.01\")      /81 (BP Location: Left arm, Patient Position: Sitting)   Pulse 85   Ht 157.5 cm (62.01\")   Wt 59 kg (130 lb)   SpO2 98%   BMI 23.77 kg/m²    Lab Results (most recent)     None        Physical Exam   Constitutional: She is oriented to person, place, and time. She appears well-developed and well-nourished. No distress.   HENT:   Head: Normocephalic and atraumatic.   Eyes: Pupils are equal, round, and reactive to light. Conjunctivae and EOM are normal.   Neck: Normal range of motion. Neck supple. No JVD present. No tracheal deviation present.   Cardiovascular: Normal rate, regular rhythm, normal heart sounds and intact distal pulses.    Pulmonary/Chest: Effort normal and breath sounds normal.   Abdominal: Soft. Bowel sounds are normal. She exhibits no distension and no mass. There is no tenderness. There is no rebound and no guarding. "   Musculoskeletal: Normal range of motion. She exhibits no edema, tenderness or deformity.   Neurological: She is alert and oriented to person, place, and time.   Skin: Skin is warm and dry. No rash noted. No erythema. No pallor.   Psychiatric: She has a normal mood and affect. Her behavior is normal. Judgment and thought content normal.   Nursing note and vitals reviewed.        Procedure     ECG 12 Lead  Date/Time: 11/30/2018 1:30 PM  Performed by: Sukumar Shepherd PA  Authorized by: Sukumar Shepherd PA   Comments: Sinus rhythm, short AK interval, minor nonspecific ST and T-wave changes, no acute changes noted.               Assessment/Plan      Diagnosis Plan   1. Coronary arteriosclerosis in native artery     2. Essential hypertension     3. Shortness of breath         The patient is stable at this time.  I will make no changes.  We can see her back in 6-12 months, sooner if indicated by course.  She will call for any complications prior to follow-up.              Patient's Body mass index is 23.77 kg/m². BMI is within normal parameters. No follow-up required.             Electronically signed by:

## 2018-11-14 ENCOUNTER — TELEPHONE (OUTPATIENT)
Dept: CARDIOLOGY | Facility: CLINIC | Age: 63
End: 2018-11-14

## 2018-11-14 NOTE — TELEPHONE ENCOUNTER
Fax received from [facility] for PA request on Crestor 10mg. Contacted pharmacy and was advised patient did  90 day supply of rosuvastatin without PA being required. Jihan Dixon MA

## 2019-01-15 DIAGNOSIS — E78.5 DYSLIPIDEMIA: ICD-10-CM

## 2019-01-15 RX ORDER — ROSUVASTATIN CALCIUM 10 MG/1
10 TABLET, FILM COATED ORAL DAILY
Qty: 90 TABLET | Refills: 3 | Status: SHIPPED | OUTPATIENT
Start: 2019-01-15 | End: 2019-01-28

## 2019-01-28 ENCOUNTER — TELEPHONE (OUTPATIENT)
Dept: CARDIOLOGY | Facility: CLINIC | Age: 64
End: 2019-01-28

## 2019-01-28 DIAGNOSIS — E78.5 DYSLIPIDEMIA: ICD-10-CM

## 2019-01-28 RX ORDER — ROSUVASTATIN CALCIUM 10 MG/1
10 TABLET, COATED ORAL DAILY
Qty: 14 TABLET | Refills: 0 | Status: SHIPPED | OUTPATIENT
Start: 2019-01-28 | End: 2020-03-09

## 2019-01-28 RX ORDER — ROSUVASTATIN CALCIUM 10 MG/1
10 TABLET, COATED ORAL DAILY
Qty: 90 TABLET | Refills: 3 | Status: SHIPPED | OUTPATIENT
Start: 2019-01-28 | End: 2019-01-28 | Stop reason: SDUPTHER

## 2019-01-28 NOTE — TELEPHONE ENCOUNTER
Called received on nurse line this AM from Nettie at [facility]. [phone] ext : [phone] req RF be sent into pharmacy for generic Crestor as well as 14 day supply to local [facility]. -BH;LOLISA

## 2019-03-01 ENCOUNTER — TELEPHONE (OUTPATIENT)
Dept: CARDIOLOGY | Facility: CLINIC | Age: 64
End: 2019-03-01

## 2019-03-01 NOTE — TELEPHONE ENCOUNTER
Pt reporting BP concerns.  She states she would like noted in her chart.  She is currently not taking any BP meds and states she has not been diagnosed with BP issues.  142/93;126/85;124/85;135/91;  Charted, Pt denies CP or SOB.  States her BP is higher than normal for her.  KH,CMA

## 2019-08-13 ENCOUNTER — TRANSCRIBE ORDERS (OUTPATIENT)
Dept: ADMINISTRATIVE | Facility: HOSPITAL | Age: 64
End: 2019-08-13

## 2019-08-13 DIAGNOSIS — Z12.31 VISIT FOR SCREENING MAMMOGRAM: Primary | ICD-10-CM

## 2019-09-20 ENCOUNTER — TELEPHONE (OUTPATIENT)
Dept: CARDIOLOGY | Facility: CLINIC | Age: 64
End: 2019-09-20

## 2019-09-20 NOTE — TELEPHONE ENCOUNTER
"THE PM CALLED BOTH NUMBERS IN THE PT'S CHART AND LEFT A VM, REGARDING A VM LEFT ON THE TRIAGE LINE.  THE PM ALSO CALLED THE PT'S EMERGENCY CONTACT, WHICH HAS \"CALLING RESTRICTIONS.\"  Pt LVM 2 voicemails c/o angina, shoulder, and neck pain. She requested a call back from a nurse, as she would like to come to the office today to get checked.     Callback # [phone]   "

## 2019-09-24 ENCOUNTER — OFFICE VISIT (OUTPATIENT)
Dept: CARDIOLOGY | Facility: CLINIC | Age: 64
End: 2019-09-24

## 2019-09-24 VITALS
OXYGEN SATURATION: 99 % | HEIGHT: 62 IN | HEART RATE: 67 BPM | DIASTOLIC BLOOD PRESSURE: 90 MMHG | BODY MASS INDEX: 24.37 KG/M2 | WEIGHT: 132.4 LBS | SYSTOLIC BLOOD PRESSURE: 149 MMHG

## 2019-09-24 DIAGNOSIS — I25.10 CORONARY ARTERIOSCLEROSIS IN NATIVE ARTERY: ICD-10-CM

## 2019-09-24 DIAGNOSIS — R00.2 PALPITATIONS: ICD-10-CM

## 2019-09-24 DIAGNOSIS — I10 ESSENTIAL HYPERTENSION: ICD-10-CM

## 2019-09-24 DIAGNOSIS — R07.2 PRECORDIAL PAIN: Primary | ICD-10-CM

## 2019-09-24 PROCEDURE — 99214 OFFICE O/P EST MOD 30 MIN: CPT | Performed by: PHYSICIAN ASSISTANT

## 2019-09-24 PROCEDURE — 93000 ELECTROCARDIOGRAM COMPLETE: CPT | Performed by: PHYSICIAN ASSISTANT

## 2019-09-24 NOTE — PROGRESS NOTES
Problem list     Subjective   Janice Disla is a 64 y.o. female     Chief Complaint   Patient presents with   • Coronary Artery Disease   Problem List:  1.) CAD  1.1) Stenting of the RCA and LAD per cath, 3-2012  1.2) Repeat [facility], 02/2017, in the setting of low-level symptoms and abnormal stress test.  Catheterization indicated patent stents, moderate disease of the LAD actually improved from time of catheterization in 2012, and significant ostial diagonal stenosis not felt an appropriate target percutaneously.  Medical management was recommended.  2.) Preserved systolic function  3.) HTN  4.) Dyslipidemia      5.) Rheumatoid arthritis    HPI  The patient presents back today for evaluation at her request.  She presents in the setting of chest discomfort and palpitations noted recently.  She describes episodes of chest discomfort as more of a sharp fleeting sensation from the lower left chest up to the neck area.  She is also have mild association to the left neck into the left upper extremity as well.  The symptoms would last typically 30 to 60 seconds and then resolved.  She did go to her primary care provider and eventually was found to have pharyngitis, with strep a serology being positive.  She was given Rocephin at that time.  Eventually, pharyngitis previously described improved.  She still had mild continued chest discomfort at that time.  At that time, she now presents for consideration of further evaluation.  She does feel that her chest pain is lessened at this time.  She has had no further neck or left upper extremity discomfort.  She has ongoing dyspnea.  She has had no PND or orthopnea.  She has had no diaphoresis or nausea with the above symptoms.  She is also noted recent episodes of palpitations.  She describes more of a quivering sensation which again lasted 30 seconds or less.  She will have no associated dizziness and certainly no syncope at that time.  This tends to occur separately than her  chest discomfort.  Most the time, her palpitations occur at night.  This can either wake her up or occur just right before she goes to sleep.  She does feel that her palpitations are persistent but her chest pain seems improved.  She has no further complaints otherwise at this time.    Current Outpatient Medications   Medication Sig Dispense Refill   • Adalimumab 40 MG/0.8ML Prefilled Syringe Kit Inject 40 mg under the skin Every 14 (Fourteen) Days.     • aspirin 81 MG tablet Take 81 mg by mouth Daily.     • BIOTIN PO Take 100 mg by mouth Daily.     • Cholecalciferol (VITAMIN D-3) 1000 UNITS capsule Take 1 capsule by mouth daily.     • colesevelam (WELCHOL) 625 MG tablet Take 3 tablets by mouth 2 (two) times a day.     • cycloSPORINE (RESTASIS) 0.05 % ophthalmic emulsion Apply  to eye 2 (two) times a day. Instill 1 drop in each eye twice daily prn     • hydroxychloroquine (PLAQUENIL) 200 MG tablet Take 1 tablet by mouth 2 (two) times a day.     • pantoprazole (PROTONIX) 40 MG EC tablet Take 40 mg by mouth Daily.  5   • Polyethylene Glycol 3350 (MIRALAX PO) Take  by mouth daily. Mix 1 capful (17gm) in 8 ounces of water , juice or tea and drink daily     • rosuvastatin (CRESTOR) 10 MG tablet Take 1 tablet by mouth Daily. 14 tablet 0     No current facility-administered medications for this visit.        Codeine; Penicillins; and Sulfa antibiotics    Past Medical History:   Diagnosis Date   • Coronary artery disease    • Hyperlipidemia    • Hypertension    • Rheumatoid arthritis (CMS/[facility])        Social History     Socioeconomic History   • Marital status:      Spouse name: Not on file   • Number of children: Not on file   • Years of education: Not on file   • Highest education level: Not on file   Tobacco Use   • Smoking status: Never Smoker   • Smokeless tobacco: Never Used   Substance and Sexual Activity   • Alcohol use: Yes     Comment: socially   • Drug use: No   • Sexual activity: Defer       Family History    Problem Relation Age of Onset   • Other Mother         CABG   • Coronary artery disease Mother    • Hyperlipidemia Mother    • Rheum arthritis Mother    • Other Sister         CABG   • Coronary artery disease Sister    • Diabetes Sister    • Hyperlipidemia Sister    • Other Other         CABG   • Coronary artery disease Other    • Other Other         CABG   • Coronary artery disease Other    • Diabetes Other    • Hyperlipidemia Other    • Rheum arthritis Other    • Breast cancer Neg Hx    • Ovarian cancer Neg Hx        Review of Systems   Constitutional: Negative.  Negative for fatigue.   HENT: Positive for congestion (nasal congestion). Negative for rhinorrhea and sneezing.    Eyes: Positive for visual disturbance (wears glasses daily).   Respiratory: Positive for cough (cough from sinus drainage). Negative for chest tightness, shortness of breath and wheezing.    Cardiovascular: Positive for chest pain (precordial pain) and palpitations (increased episodes of palpitaitons/flutters). Negative for leg swelling.   Gastrointestinal: Negative.  Negative for abdominal pain, nausea and vomiting.   Endocrine: Negative.  Negative for cold intolerance and heat intolerance.   Genitourinary: Negative.  Negative for difficulty urinating, frequency and urgency.   Musculoskeletal: Positive for arthralgias (arthritis). Negative for back pain, neck pain and neck stiffness.   Skin: Negative.  Negative for rash and wound.   Allergic/Immunologic: Positive for environmental allergies (mold and cats). Negative for food allergies.   Neurological: Negative.  Negative for dizziness, syncope, weakness and light-headedness.   Hematological: Bruises/bleeds easily (bruises and bleeds easily).   Psychiatric/Behavioral: Negative for agitation, confusion and sleep disturbance (denies waking up smothering/SOA). The patient is nervous/anxious (easily nervous/anxious).        Objective   Vitals:    09/24/19 0928   BP: 149/90   BP Location: Left  "arm   Patient Position: Sitting   Pulse: 67   SpO2: 99%   Weight: 60.1 kg (132 lb 6.4 oz)   Height: 157.5 cm (62\")      /90 (BP Location: Left arm, Patient Position: Sitting)   Pulse 67   Ht 157.5 cm (62\")   Wt 60.1 kg (132 lb 6.4 oz)   SpO2 99%   BMI 24.22 kg/m²    Lab Results (most recent)     None        Physical Exam   Constitutional: She is oriented to person, place, and time. She appears well-developed and well-nourished. No distress.   HENT:   Head: Normocephalic and atraumatic.   Eyes: Conjunctivae and EOM are normal. Pupils are equal, round, and reactive to light.   Neck: Normal range of motion. Neck supple. No JVD present. No tracheal deviation present.   Cardiovascular: Normal rate, regular rhythm, normal heart sounds and intact distal pulses.   Pulmonary/Chest: Effort normal and breath sounds normal.   Abdominal: Soft. Bowel sounds are normal. She exhibits no distension and no mass. There is no tenderness. There is no rebound and no guarding.   Musculoskeletal: Normal range of motion. She exhibits no edema, tenderness or deformity.   Neurological: She is alert and oriented to person, place, and time.   Skin: Skin is warm and dry. No rash noted. No erythema. No pallor.   Psychiatric: She has a normal mood and affect. Her behavior is normal. Judgment and thought content normal.   Nursing note and vitals reviewed.        Procedure     ECG 12 Lead  Date/Time: 9/24/2019 9:31 AM  Performed by: Sukumar Shepherd PA  Authorized by: Sukumar Shepherd PA   Comparison: compared with previous ECG from 11/12/2018  Comparison to previous ECG: Sinus rhythm at 56, normal axis, no acute changes noted.  Comments: Chest pain               Assessment/Plan      Diagnosis Plan   1. Precordial pain  ECG 12 Lead    Adult Transthoracic Echo Complete W/ Cont if Necessary Per Protocol    Cardiac Event Monitor    Stress Test With Myocardial Perfusion One Day   2. Palpitations  Adult Transthoracic Echo Complete W/ Cont if " Necessary Per Protocol    Cardiac Event Monitor    Stress Test With Myocardial Perfusion One Day   3. Coronary arteriosclerosis in native artery  Adult Transthoracic Echo Complete W/ Cont if Necessary Per Protocol    Cardiac Event Monitor    Stress Test With Myocardial Perfusion One Day   4. Essential hypertension  Adult Transthoracic Echo Complete W/ Cont if Necessary Per Protocol    Cardiac Event Monitor    Stress Test With Myocardial Perfusion One Day     1.  With symptoms of chest discomfort and known coronary artery disease, I feel she needs repeat evaluation for ischemia and for a stratification otherwise.  I will schedule for Lexiscan nuclear stress test.  With RA, the patient would have difficulties with treadmill protocol and will be scheduled for Lexiscan.    2.  Also with symptoms as outlined above, I feel she needs repeat evaluation with an echocardiogram.  We will schedule for the echo to evaluate LV size and function, valvular morphologies, pericardium, and for right-sided parameters otherwise.    3.  With palpitations, I would like to schedule for an event monitor to evaluate for dysrhythmic substrates.    4.  For now, I would continue medications without change.  It appears that her blood pressures are fairly well controlled.  She will monitor that and call us for complications.  We will make no changes at this time otherwise.  We will see her back after the above and recommended further at that time.  She will call for any issues prior to follow-up.           Patient's Body mass index is 24.22 kg/m². BMI is within normal parameters. No follow-up required..             Electronically signed by:

## 2019-10-11 ENCOUNTER — APPOINTMENT (OUTPATIENT)
Dept: MAMMOGRAPHY | Facility: HOSPITAL | Age: 64
End: 2019-10-11

## 2019-10-16 ENCOUNTER — APPOINTMENT (OUTPATIENT)
Dept: CARDIOLOGY | Facility: HOSPITAL | Age: 64
End: 2019-10-16

## 2019-10-16 ENCOUNTER — HOSPITAL ENCOUNTER (OUTPATIENT)
Dept: CARDIOLOGY | Facility: HOSPITAL | Age: 64
Discharge: HOME | End: 2019-10-16

## 2019-10-16 VITALS — HEIGHT: 62 IN | BODY MASS INDEX: 24.38 KG/M2 | WEIGHT: 132.5 LBS

## 2019-10-16 DIAGNOSIS — R07.2 PRECORDIAL PAIN: ICD-10-CM

## 2019-10-16 DIAGNOSIS — R00.2 PALPITATIONS: ICD-10-CM

## 2019-10-16 DIAGNOSIS — I25.10 CORONARY ARTERIOSCLEROSIS IN NATIVE ARTERY: ICD-10-CM

## 2019-10-16 DIAGNOSIS — I10 ESSENTIAL HYPERTENSION: ICD-10-CM

## 2019-10-16 PROCEDURE — 93306 TTE W/DOPPLER COMPLETE: CPT

## 2019-10-16 PROCEDURE — 93306 TTE W/DOPPLER COMPLETE: CPT | Performed by: INTERNAL MEDICINE

## 2019-10-16 PROCEDURE — A9500 TC99M SESTAMIBI: HCPCS | Performed by: INTERNAL MEDICINE

## 2019-10-16 PROCEDURE — 78452 HT MUSCLE IMAGE SPECT MULT: CPT

## 2019-10-16 PROCEDURE — 78452 HT MUSCLE IMAGE SPECT MULT: CPT | Performed by: INTERNAL MEDICINE

## 2019-10-16 PROCEDURE — 93017 CV STRESS TEST TRACING ONLY: CPT

## 2019-10-16 PROCEDURE — 0 TECHNETIUM SESTAMIBI: Performed by: INTERNAL MEDICINE

## 2019-10-16 PROCEDURE — 25010000002 REGADENOSON 0.4 MG/5ML SOLUTION: Performed by: INTERNAL MEDICINE

## 2019-10-16 PROCEDURE — 93018 CV STRESS TEST I&R ONLY: CPT | Performed by: INTERNAL MEDICINE

## 2019-10-16 RX ADMIN — TECHNETIUM TC 99M SESTAMIBI 1 DOSE: 1 INJECTION INTRAVENOUS at 14:25

## 2019-10-16 RX ADMIN — TECHNETIUM TC 99M SESTAMIBI 1 DOSE: 1 INJECTION INTRAVENOUS at 14:26

## 2019-10-16 RX ADMIN — REGADENOSON 0.4 MG: 0.08 INJECTION, SOLUTION INTRAVENOUS at 14:26

## 2019-10-17 LAB
BH CV STRESS COMMENTS STAGE 1: NORMAL
BH CV STRESS DOSE REGADENOSON STAGE 1: 0.4
BH CV STRESS DURATION MIN STAGE 1: 0
BH CV STRESS DURATION SEC STAGE 1: 10
BH CV STRESS PROTOCOL 1: NORMAL
BH CV STRESS RECOVERY BP: NORMAL MMHG
BH CV STRESS RECOVERY HR: 69 BPM
BH CV STRESS STAGE 1: 1
MAXIMAL PREDICTED HEART RATE: 154 BPM
PERCENT MAX PREDICTED HR: 61.04 %
STRESS BASELINE BP: NORMAL MMHG
STRESS BASELINE HR: 64 BPM
STRESS PERCENT HR: 72 %
STRESS POST PEAK BP: NORMAL MMHG
STRESS POST PEAK HR: 94 BPM
STRESS TARGET HR: 131 BPM

## 2019-10-24 LAB
BH CV ECHO MEAS - ACS: 2 CM
BH CV ECHO MEAS - AO MAX PG (FULL): 3.1 MMHG
BH CV ECHO MEAS - AO MAX PG: 6.1 MMHG
BH CV ECHO MEAS - AO MEAN PG (FULL): 2 MMHG
BH CV ECHO MEAS - AO MEAN PG: 3 MMHG
BH CV ECHO MEAS - AO ROOT AREA (BSA CORRECTED): 1.8
BH CV ECHO MEAS - AO ROOT AREA: 6.6 CM^2
BH CV ECHO MEAS - AO ROOT DIAM: 2.9 CM
BH CV ECHO MEAS - AO V2 MAX: 123 CM/SEC
BH CV ECHO MEAS - AO V2 MEAN: 76.7 CM/SEC
BH CV ECHO MEAS - AO V2 VTI: 26.5 CM
BH CV ECHO MEAS - BSA(HAYCOCK): 1.6 M^2
BH CV ECHO MEAS - BSA: 1.6 M^2
BH CV ECHO MEAS - BZI_BMI: 24.1 KILOGRAMS/M^2
BH CV ECHO MEAS - BZI_METRIC_HEIGHT: 157.5 CM
BH CV ECHO MEAS - BZI_METRIC_WEIGHT: 59.9 KG
BH CV ECHO MEAS - EDV(CUBED): 67.4 ML
BH CV ECHO MEAS - EDV(TEICH): 72.9 ML
BH CV ECHO MEAS - EF(CUBED): 74.8 %
BH CV ECHO MEAS - EF(TEICH): 67.2 %
BH CV ECHO MEAS - ESV(CUBED): 17 ML
BH CV ECHO MEAS - ESV(TEICH): 23.9 ML
BH CV ECHO MEAS - FS: 36.9 %
BH CV ECHO MEAS - IVS/LVPW: 0.98
BH CV ECHO MEAS - IVSD: 0.91 CM
BH CV ECHO MEAS - LA DIMENSION(2D): 3 CM
BH CV ECHO MEAS - LA DIMENSION: 3.1 CM
BH CV ECHO MEAS - LA/AO: 1.1
BH CV ECHO MEAS - LAT PEAK E' VEL: 7.1 CM/SEC
BH CV ECHO MEAS - LV IVRT: 0.13 SEC
BH CV ECHO MEAS - LV MASS(C)D: 116.8 GRAMS
BH CV ECHO MEAS - LV MASS(C)DI: 72.9 GRAMS/M^2
BH CV ECHO MEAS - LV MAX PG: 3 MMHG
BH CV ECHO MEAS - LV MEAN PG: 1 MMHG
BH CV ECHO MEAS - LV V1 MAX: 86.2 CM/SEC
BH CV ECHO MEAS - LV V1 MEAN: 49.3 CM/SEC
BH CV ECHO MEAS - LV V1 VTI: 22.3 CM
BH CV ECHO MEAS - LVIDD: 4.1 CM
BH CV ECHO MEAS - LVIDS: 2.6 CM
BH CV ECHO MEAS - LVPWD: 0.93 CM
BH CV ECHO MEAS - MED PEAK E' VEL: 6.6 CM/SEC
BH CV ECHO MEAS - MR MAX PG: 13.2 MMHG
BH CV ECHO MEAS - MR MAX VEL: 182 CM/SEC
BH CV ECHO MEAS - MV A MAX VEL: 58.5 CM/SEC
BH CV ECHO MEAS - MV DEC TIME: 0.3 SEC
BH CV ECHO MEAS - MV E MAX VEL: 53.7 CM/SEC
BH CV ECHO MEAS - MV E/A: 0.92
BH CV ECHO MEAS - MV MAX PG: 2.5 MMHG
BH CV ECHO MEAS - MV MEAN PG: 1 MMHG
BH CV ECHO MEAS - MV V2 MAX: 78.9 CM/SEC
BH CV ECHO MEAS - MV V2 MEAN: 51 CM/SEC
BH CV ECHO MEAS - MV V2 VTI: 28.5 CM
BH CV ECHO MEAS - PA MAX PG (FULL): 1 MMHG
BH CV ECHO MEAS - PA MAX PG: 2.6 MMHG
BH CV ECHO MEAS - PA MEAN PG (FULL): 0 MMHG
BH CV ECHO MEAS - PA MEAN PG: 1 MMHG
BH CV ECHO MEAS - PA V2 MAX: 80.3 CM/SEC
BH CV ECHO MEAS - PA V2 MEAN: 56 CM/SEC
BH CV ECHO MEAS - PA V2 VTI: 19.8 CM
BH CV ECHO MEAS - RAP SYSTOLE: 10 MMHG
BH CV ECHO MEAS - RV MAX PG: 1.6 MMHG
BH CV ECHO MEAS - RV MEAN PG: 1 MMHG
BH CV ECHO MEAS - RV V1 MAX: 62.7 CM/SEC
BH CV ECHO MEAS - RV V1 MEAN: 39.1 CM/SEC
BH CV ECHO MEAS - RV V1 VTI: 14.2 CM
BH CV ECHO MEAS - RVDD: 2.4 CM
BH CV ECHO MEAS - RVSP: 26 MMHG
BH CV ECHO MEAS - SI(AO): 109.3 ML/M^2
BH CV ECHO MEAS - SI(CUBED): 31.5 ML/M^2
BH CV ECHO MEAS - SI(TEICH): 30.6 ML/M^2
BH CV ECHO MEAS - SV(AO): 175 ML
BH CV ECHO MEAS - SV(CUBED): 50.4 ML
BH CV ECHO MEAS - SV(TEICH): 49 ML
BH CV ECHO MEAS - TR MAX VEL: 194 CM/SEC
BH CV ECHO MEASUREMENTS AVERAGE E/E' RATIO: 7.84
MAXIMAL PREDICTED HEART RATE: 154 BPM
STRESS TARGET HR: 131 BPM

## 2019-12-09 ENCOUNTER — HOSPITAL ENCOUNTER (OUTPATIENT)
Dept: MAMMOGRAPHY | Facility: HOSPITAL | Age: 64
Discharge: HOME | End: 2019-12-09
Admitting: OBSTETRICS & GYNECOLOGY

## 2019-12-09 DIAGNOSIS — Z12.31 VISIT FOR SCREENING MAMMOGRAM: ICD-10-CM

## 2019-12-09 PROCEDURE — 77067 SCR MAMMO BI INCL CAD: CPT | Performed by: RADIOLOGY

## 2019-12-09 PROCEDURE — 77063 BREAST TOMOSYNTHESIS BI: CPT | Performed by: RADIOLOGY

## 2019-12-09 PROCEDURE — 77067 SCR MAMMO BI INCL CAD: CPT

## 2019-12-09 PROCEDURE — 77063 BREAST TOMOSYNTHESIS BI: CPT

## 2020-03-09 RX ORDER — ROSUVASTATIN CALCIUM 10 MG/1
TABLET, COATED ORAL
Qty: 90 TABLET | Refills: 3 | Status: SHIPPED | OUTPATIENT
Start: 2020-03-09 | End: 2020-09-01 | Stop reason: SDUPTHER

## 2020-09-01 ENCOUNTER — OFFICE VISIT (OUTPATIENT)
Dept: CARDIOLOGY | Facility: CLINIC | Age: 65
End: 2020-09-01

## 2020-09-01 VITALS
DIASTOLIC BLOOD PRESSURE: 85 MMHG | OXYGEN SATURATION: 98 % | HEART RATE: 70 BPM | WEIGHT: 129 LBS | HEIGHT: 62 IN | TEMPERATURE: 97.1 F | BODY MASS INDEX: 23.74 KG/M2 | SYSTOLIC BLOOD PRESSURE: 126 MMHG

## 2020-09-01 DIAGNOSIS — E78.2 MIXED HYPERLIPIDEMIA: ICD-10-CM

## 2020-09-01 DIAGNOSIS — R06.02 SHORTNESS OF BREATH: ICD-10-CM

## 2020-09-01 DIAGNOSIS — I25.10 CORONARY ARTERIOSCLEROSIS IN NATIVE ARTERY: Primary | ICD-10-CM

## 2020-09-01 PROCEDURE — 99213 OFFICE O/P EST LOW 20 MIN: CPT | Performed by: PHYSICIAN ASSISTANT

## 2020-09-01 RX ORDER — CETIRIZINE HYDROCHLORIDE 10 MG/1
10 TABLET ORAL DAILY
COMMUNITY

## 2020-09-01 RX ORDER — PSEUDOEPHEDRINE HCL 30 MG
30 TABLET ORAL EVERY 4 HOURS PRN
COMMUNITY
End: 2022-09-01

## 2020-09-01 RX ORDER — ALBUTEROL SULFATE 90 UG/1
2 AEROSOL, METERED RESPIRATORY (INHALATION) EVERY 4 HOURS PRN
COMMUNITY
Start: 2020-07-10 | End: 2022-03-02

## 2020-09-01 RX ORDER — ROSUVASTATIN CALCIUM 10 MG/1
10 TABLET, COATED ORAL DAILY
Qty: 90 TABLET | Refills: 3 | Status: SHIPPED | OUTPATIENT
Start: 2020-09-01 | End: 2021-06-30

## 2020-09-01 NOTE — PROGRESS NOTES
Problem list     Subjective   Janice Disla is a 64 y.o. female     Chief Complaint   Patient presents with   • Palpitations     presents for 6 month f/u   • Shortness of Breath   Problem List:  1.) CAD  1.1) Stenting of the RCA and LAD per cath, 3-2012  1.2) Repeat [facility], 02/2017, in the setting of low-level symptoms and abnormal stress test.  Catheterization indicated patent stents, moderate disease of the LAD actually improved from time of catheterization in 2012, and significant ostial diagonal stenosis not felt an appropriate target percutaneously.  Medical management was recommended.  2.) Preserved systolic function  3.) HTN  4.) Dyslipidemia      5.) Rheumatoid arthritis    HPI  This pleasant patient presents in today for routine evaluation and follow-up.  Since last evaluation here, the patient is continued to do well from cardiovascular standpoint.  Currently, the patient denies continued chest pain.  She has stable dyspnea.  She has palpitations, which she relates mostly to stress.  These occur predominantly at night, lasting only a few seconds and then resolving completely.  She has been normotensive when checked at home.  She denies syncope.  She does have episodes of vertigo which is fairly new onset as of recent.  She will be seeing her primary care provider for the same.  The patient is doing well otherwise and has no further complaints.    Current Outpatient Medications on File Prior to Visit   Medication Sig Dispense Refill   • Adalimumab 40 MG/0.8ML Prefilled Syringe Kit Inject 40 mg under the skin into the appropriate area as directed 1 (One) Time Per Week.     • albuterol sulfate  (90 Base) MCG/ACT inhaler Inhale 2 puffs Every 4 (Four) Hours As Needed.     • aspirin 81 MG tablet Take 81 mg by mouth Daily.     • BIOTIN PO Take 100 mg by mouth Daily.     • cetirizine (zyrTEC) 10 MG tablet Take 10 mg by mouth Daily.     • Cholecalciferol (VITAMIN D-3) 1000 UNITS capsule Take 1 capsule by mouth  daily.     • colesevelam (WELCHOL) 625 MG tablet Take 3 tablets by mouth 2 (two) times a day.     • cycloSPORINE (RESTASIS) 0.05 % ophthalmic emulsion Apply  to eye 2 (two) times a day. Instill 1 drop in each eye twice daily prn     • hydroxychloroquine (PLAQUENIL) 200 MG tablet Take 1 tablet by mouth 2 (two) times a day.     • pantoprazole (PROTONIX) 40 MG EC tablet Take 40 mg by mouth Daily.  5   • Polyethylene Glycol 3350 (MIRALAX PO) Take  by mouth daily. Mix 1 capful (17gm) in 8 ounces of water , juice or tea and drink daily     • pseudoephedrine (SUDAFED) 30 MG tablet Take 30 mg by mouth Every 4 (Four) Hours As Needed for Congestion.     • [DISCONTINUED] rosuvastatin (CRESTOR) 10 MG tablet TAKE 1 TABLET EVERY DAY 90 tablet 3     No current facility-administered medications on file prior to visit.        Codeine; Penicillins; and Sulfa antibiotics    Past Medical History:   Diagnosis Date   • Coronary artery disease    • Hyperlipidemia    • Hypertension    • Rheumatoid arthritis (CMS/[facility])        Social History     Socioeconomic History   • Marital status:      Spouse name: Not on file   • Number of children: Not on file   • Years of education: Not on file   • Highest education level: Not on file   Tobacco Use   • Smoking status: Never Smoker   • Smokeless tobacco: Never Used   Substance and Sexual Activity   • Alcohol use: Yes     Comment: socially   • Drug use: No   • Sexual activity: Defer       Family History   Problem Relation Age of Onset   • Other Mother         CABG   • Coronary artery disease Mother    • Hyperlipidemia Mother    • Rheum arthritis Mother    • Other Sister         CABG   • Coronary artery disease Sister    • Diabetes Sister    • Hyperlipidemia Sister    • Other Other         CABG   • Coronary artery disease Other    • Other Other         CABG   • Coronary artery disease Other    • Diabetes Other    • Hyperlipidemia Other    • Rheum arthritis Other    • Breast cancer Neg Hx    •  "Ovarian cancer Neg Hx        Review of Systems   Constitutional: Positive for fatigue. Negative for chills, diaphoresis and fever.   HENT: Positive for congestion.         Sinus issues   Eyes: Positive for visual disturbance.   Respiratory: Positive for shortness of breath (on exertion). Negative for apnea, cough, chest tightness and wheezing.    Cardiovascular: Positive for palpitations. Negative for chest pain and leg swelling.   Gastrointestinal: Negative.  Negative for abdominal pain, blood in stool, constipation, diarrhea, nausea and vomiting.   Endocrine: Negative.    Genitourinary: Negative.  Negative for hematuria.   Musculoskeletal: Positive for arthralgias. Negative for back pain, myalgias and neck pain.   Skin: Negative.  Negative for rash and wound.   Allergic/Immunologic: Positive for environmental allergies. Negative for food allergies.   Neurological: Positive for dizziness (when bending over or looking down) and headaches (sinus). Negative for syncope, weakness, light-headedness and numbness.   Hematological: Bruises/bleeds easily.   Psychiatric/Behavioral: Positive for sleep disturbance (insomnia). Negative for agitation. The patient is not nervous/anxious.        Objective   Vitals:    09/01/20 0920   BP: 126/85   BP Location: Left arm   Patient Position: Sitting   Pulse: 70   Temp: 97.1 °F (36.2 °C)   SpO2: 98%   Weight: 58.5 kg (129 lb)   Height: 157.5 cm (62\")      /85 (BP Location: Left arm, Patient Position: Sitting)   Pulse 70   Temp 97.1 °F (36.2 °C)   Ht 157.5 cm (62\")   Wt 58.5 kg (129 lb)   SpO2 98%   BMI 23.59 kg/m²    Lab Results (most recent)     None        Physical Exam   Constitutional: She is oriented to person, place, and time. She appears well-developed and well-nourished. No distress.   HENT:   Head: Normocephalic and atraumatic.   Eyes: Pupils are equal, round, and reactive to light. Conjunctivae and EOM are normal.   Neck: Normal range of motion. Neck supple. No " JVD present. No tracheal deviation present.   Cardiovascular: Normal rate, regular rhythm, normal heart sounds and intact distal pulses.   Pulmonary/Chest: Effort normal and breath sounds normal.   Abdominal: Soft. Bowel sounds are normal. She exhibits no distension and no mass. There is no tenderness. There is no rebound and no guarding.   Musculoskeletal: Normal range of motion. She exhibits no edema, tenderness or deformity.   Neurological: She is alert and oriented to person, place, and time.   Skin: Skin is warm and dry. No rash noted. No erythema. No pallor.   Psychiatric: She has a normal mood and affect. Her behavior is normal. Judgment and thought content normal.   Nursing note and vitals reviewed.        Procedure   Procedures       Assessment/Plan      Diagnosis Plan   1. Coronary arteriosclerosis in native artery     2. Shortness of breath     3. Mixed hyperlipidemia       1.  At this time, the patient appears to be doing well from cardiovascular standpoint.  She denies continued symptoms of chest pain, which was her complaint at last evaluation, last year.  She was subsequent scheduled for stress test and an echocardiogram at that time.  Those studies were benign.  I do not feel further work-up is warranted at this time.    2.  The patient has stable dyspnea.  She has no further cardiac symptoms of any significance otherwise.  We reviewed that she would contact us immediately for any breakthrough symptoms or complications otherwise.    3.  The patient does request refills for Crestor.  That has been sent to her pharmacy.  She will continue routine lipids and laboratories otherwise with her primary care provider.    4.  As the patient is doing well currently, nothing further for now.  We will continue to see her on 6-12 month intervals.           Janice LOUANN Disla  reports that she has never smoked. She has never used smokeless tobacco.        Patient's Body mass index is 23.59 kg/m². BMI is within normal  parameters. No follow-up required..             Electronically signed by:

## 2020-11-12 ENCOUNTER — TRANSCRIBE ORDERS (OUTPATIENT)
Dept: ADMINISTRATIVE | Facility: HOSPITAL | Age: 65
End: 2020-11-12

## 2020-11-12 DIAGNOSIS — Z12.31 VISIT FOR SCREENING MAMMOGRAM: Primary | ICD-10-CM

## 2021-03-11 ENCOUNTER — HOSPITAL ENCOUNTER (OUTPATIENT)
Dept: MAMMOGRAPHY | Facility: HOSPITAL | Age: 66
End: 2021-03-11

## 2021-05-28 ENCOUNTER — HOSPITAL ENCOUNTER (OUTPATIENT)
Dept: MAMMOGRAPHY | Facility: HOSPITAL | Age: 66
Discharge: HOME | End: 2021-05-28
Admitting: OBSTETRICS & GYNECOLOGY

## 2021-05-28 DIAGNOSIS — Z12.31 VISIT FOR SCREENING MAMMOGRAM: ICD-10-CM

## 2021-05-28 PROCEDURE — 77067 SCR MAMMO BI INCL CAD: CPT

## 2021-05-28 PROCEDURE — 77063 BREAST TOMOSYNTHESIS BI: CPT

## 2021-05-28 PROCEDURE — 77067 SCR MAMMO BI INCL CAD: CPT | Performed by: RADIOLOGY

## 2021-05-28 PROCEDURE — 77063 BREAST TOMOSYNTHESIS BI: CPT | Performed by: RADIOLOGY

## 2021-06-30 RX ORDER — ROSUVASTATIN CALCIUM 10 MG/1
TABLET, COATED ORAL
Qty: 90 TABLET | Refills: 3 | Status: SHIPPED | OUTPATIENT
Start: 2021-06-30 | End: 2022-04-20

## 2021-09-01 ENCOUNTER — OFFICE VISIT (OUTPATIENT)
Dept: CARDIOLOGY | Facility: CLINIC | Age: 66
End: 2021-09-01

## 2021-09-01 VITALS
DIASTOLIC BLOOD PRESSURE: 76 MMHG | HEART RATE: 64 BPM | HEIGHT: 62 IN | BODY MASS INDEX: 24.44 KG/M2 | OXYGEN SATURATION: 98 % | SYSTOLIC BLOOD PRESSURE: 112 MMHG | WEIGHT: 132.8 LBS

## 2021-09-01 DIAGNOSIS — R06.02 SHORTNESS OF BREATH: ICD-10-CM

## 2021-09-01 DIAGNOSIS — I10 ESSENTIAL HYPERTENSION: ICD-10-CM

## 2021-09-01 DIAGNOSIS — I25.10 CORONARY ARTERY DISEASE INVOLVING NATIVE CORONARY ARTERY OF NATIVE HEART WITHOUT ANGINA PECTORIS: Primary | ICD-10-CM

## 2021-09-01 PROCEDURE — 93000 ELECTROCARDIOGRAM COMPLETE: CPT | Performed by: PHYSICIAN ASSISTANT

## 2021-09-01 PROCEDURE — 99213 OFFICE O/P EST LOW 20 MIN: CPT | Performed by: PHYSICIAN ASSISTANT

## 2021-09-01 NOTE — PROGRESS NOTES
Problem list     Subjective   Janice Disla is a 65 y.o. female     Chief Complaint   Patient presents with   • Follow-up     1 year      Problem List:  1.) CAD  1.1) Stenting of the RCA and LAD per cath, 3-2012  1.2) Repeat [facility], 02/2017, in the setting of low-level symptoms and abnormal stress test.  Catheterization indicated patent stents, moderate disease of the LAD actually improved from time of catheterization in 2012, and significant ostial diagonal stenosis not felt an appropriate target percutaneously.  Medical management was recommended.  2.) Preserved systolic function  3.) HTN  4.) Dyslipidemia      5.) Rheumatoid arthritis     HPI  The patient presents back to the clinic today for routine evaluation and follow-up.  Since last evaluation, the patient is continued to do well from cardiovascular standpoint.  Her biggest issue at this time is with diffuse arthritic complications.  She currently denies chest pain.  She has stable dyspnea.  She has no failure or dysrhythmic symptoms.  Blood pressures continue to be well controlled.  She tells me that laboratories are followed with her primary care provider and have been mostly normal as of recent, in particular lipid parameters are felt to be normal.  The patient has no further complaints otherwise and feels that she is doing well from general cardiovascular standpoint.    Current Outpatient Medications on File Prior to Visit   Medication Sig Dispense Refill   • Adalimumab 40 MG/0.8ML Prefilled Syringe Kit Inject 40 mg under the skin into the appropriate area as directed 1 (One) Time Per Week.     • aspirin 81 MG tablet Take 81 mg by mouth Daily.     • BIOTIN PO Take 100 mg by mouth Daily.     • cetirizine (zyrTEC) 10 MG tablet Take 10 mg by mouth Daily.     • Cholecalciferol (VITAMIN D-3) 1000 UNITS capsule Take 1 capsule by mouth daily.     • colesevelam (WELCHOL) 625 MG tablet Take 3 tablets by mouth 2 (two) times a day.     • cycloSPORINE (RESTASIS) 0.05  % ophthalmic emulsion Apply  to eye 2 (two) times a day. Instill 1 drop in each eye twice daily prn     • hydroxychloroquine (PLAQUENIL) 200 MG tablet Take 1 tablet by mouth 2 (two) times a day.     • Misc Natural Products (NEURIVA PO) Take  by mouth.     • Multiple Vitamins-Minerals (ZINC PO) Take  by mouth.     • pantoprazole (PROTONIX) 40 MG EC tablet Take 40 mg by mouth Daily.  5   • Polyethylene Glycol 3350 (MIRALAX PO) Take  by mouth daily. Mix 1 capful (17gm) in 8 ounces of water , juice or tea and drink daily     • pseudoephedrine (SUDAFED) 30 MG tablet Take 30 mg by mouth Every 4 (Four) Hours As Needed for Congestion.     • rosuvastatin (CRESTOR) 10 MG tablet TAKE 1 TABLET EVERY DAY 90 tablet 3   • albuterol sulfate  (90 Base) MCG/ACT inhaler Inhale 2 puffs Every 4 (Four) Hours As Needed.       No current facility-administered medications on file prior to visit.       Codeine, Penicillins, and Sulfa antibiotics    Past Medical History:   Diagnosis Date   • Coronary artery disease    • Hyperlipidemia    • Hypertension    • Rheumatoid arthritis (CMS/[facility])        Social History     Socioeconomic History   • Marital status:      Spouse name: Not on file   • Number of children: Not on file   • Years of education: Not on file   • Highest education level: Not on file   Tobacco Use   • Smoking status: Never Smoker   • Smokeless tobacco: Never Used   Substance and Sexual Activity   • Alcohol use: Yes     Comment: socially   • Drug use: No   • Sexual activity: Defer       Family History   Problem Relation Age of Onset   • Other Mother         CABG   • Coronary artery disease Mother    • Hyperlipidemia Mother    • Rheum arthritis Mother    • Other Sister         CABG   • Coronary artery disease Sister    • Diabetes Sister    • Hyperlipidemia Sister    • Other Other         CABG   • Coronary artery disease Other    • Other Other         CABG   • Coronary artery disease Other    • Diabetes Other    •  "Hyperlipidemia Other    • Rheum arthritis Other    • Breast cancer Neg Hx    • Ovarian cancer Neg Hx        Review of Systems   Constitutional: Negative.  Negative for chills, fatigue and fever.   HENT: Negative.  Negative for congestion, rhinorrhea and sore throat.    Eyes: Positive for visual disturbance (glasses).   Respiratory: Negative.  Negative for chest tightness, shortness of breath and wheezing.    Cardiovascular: Positive for palpitations. Negative for chest pain and leg swelling.   Gastrointestinal: Negative.    Endocrine: Negative.    Genitourinary: Negative.    Musculoskeletal: Positive for arthralgias and neck pain. Negative for back pain.   Skin: Negative.  Negative for rash and wound.   Allergic/Immunologic: Positive for environmental allergies.   Neurological: Positive for numbness (hand right ). Negative for dizziness, weakness and headaches.   Hematological: Bruises/bleeds easily (bruises/ bleeds).   Psychiatric/Behavioral: Positive for sleep disturbance (falling/ staying asleep).       Objective   Vitals:    09/01/21 0904   BP: 112/76   BP Location: Left arm   Patient Position: Sitting   Pulse: 64   SpO2: 98%   Weight: 60.2 kg (132 lb 12.8 oz)   Height: 157.5 cm (62\")      /76 (BP Location: Left arm, Patient Position: Sitting)   Pulse 64   Ht 157.5 cm (62\")   Wt 60.2 kg (132 lb 12.8 oz)   SpO2 98%   BMI 24.29 kg/m²    Lab Results (most recent)     None        Physical Exam  Vitals and nursing note reviewed.   Constitutional:       General: She is not in acute distress.     Appearance: She is well-developed.   HENT:      Head: Normocephalic and atraumatic.   Eyes:      Conjunctiva/sclera: Conjunctivae normal.      Pupils: Pupils are equal, round, and reactive to light.   Neck:      Vascular: No JVD.      Trachea: No tracheal deviation.   Cardiovascular:      Rate and Rhythm: Normal rate and regular rhythm.      Heart sounds: Normal heart sounds.   Pulmonary:      Effort: Pulmonary " effort is normal.      Breath sounds: Normal breath sounds.   Abdominal:      General: Bowel sounds are normal. There is no distension.      Palpations: Abdomen is soft. There is no mass.      Tenderness: There is no abdominal tenderness. There is no guarding or rebound.   Musculoskeletal:         General: No tenderness or deformity. Normal range of motion.      Cervical back: Normal range of motion and neck supple.   Skin:     General: Skin is warm and dry.      Coloration: Skin is not pale.      Findings: No erythema or rash.   Neurological:      Mental Status: She is alert and oriented to person, place, and time.   Psychiatric:         Behavior: Behavior normal.         Thought Content: Thought content normal.         Judgment: Judgment normal.           Procedure     ECG 12 Lead    Date/Time: 9/1/2021 9:08 AM  Performed by: Sukumar Shepherd PA  Authorized by: Sukumar Shepherd PA   Comparison: compared with previous ECG from 9/24/2019  Comparison to previous ECG: Sinus rhythm, rate 59, normal axis, no acute changes noted.                 Assessment/Plan      Diagnosis Plan   1. Coronary artery disease involving native coronary artery of native heart without angina pectoris     2. Shortness of breath     3. Essential hypertension       1.  At this time, the patient appears to be doing well from cardiovascular standpoint.  She denies symptoms of angina, failure, or dysrhythmias.  Her dyspnea is stable and at baseline.    2.  Blood pressures continue to be normal when checked at home.  She will monitor that and call to us for any issues.    3.  I would make no adjustments in medications.  As the patient is doing well, nothing further and we will continue to see her on 6 to 12-month intervals.                Electronically signed by:

## 2021-09-01 NOTE — PATIENT INSTRUCTIONS

## 2022-03-02 ENCOUNTER — OFFICE VISIT (OUTPATIENT)
Dept: CARDIOLOGY | Facility: CLINIC | Age: 67
End: 2022-03-02

## 2022-03-02 VITALS
BODY MASS INDEX: 24.44 KG/M2 | SYSTOLIC BLOOD PRESSURE: 141 MMHG | HEIGHT: 62 IN | DIASTOLIC BLOOD PRESSURE: 92 MMHG | WEIGHT: 132.8 LBS | OXYGEN SATURATION: 99 % | HEART RATE: 66 BPM

## 2022-03-02 DIAGNOSIS — R06.02 SHORTNESS OF BREATH: ICD-10-CM

## 2022-03-02 DIAGNOSIS — I10 ESSENTIAL HYPERTENSION: ICD-10-CM

## 2022-03-02 DIAGNOSIS — I25.10 CORONARY ARTERY DISEASE INVOLVING NATIVE CORONARY ARTERY OF NATIVE HEART WITHOUT ANGINA PECTORIS: Primary | ICD-10-CM

## 2022-03-02 PROCEDURE — 99213 OFFICE O/P EST LOW 20 MIN: CPT | Performed by: PHYSICIAN ASSISTANT

## 2022-03-02 NOTE — PATIENT INSTRUCTIONS

## 2022-03-02 NOTE — PROGRESS NOTES
Problem list     Subjective   Janice Disla is a 66 y.o. female     Chief Complaint   Patient presents with   • Follow-up     6 month    Problem List:  1.) CAD  1.1) Stenting of the RCA and LAD per cath, 3-2012  1.2) Repeat [facility], 02/2017, in the setting of low-level symptoms and abnormal stress test.  Catheterization indicated patent stents, moderate disease of the LAD actually improved from time of catheterization in 2012, and significant ostial diagonal stenosis not felt an appropriate target percutaneously.  Medical management was recommended.  2.) Preserved systolic function  3.) HTN  4.) Dyslipidemia      5.) Rheumatoid arthritis    HPI  The patient presents into the clinic today for routine evaluation and follow-up.  She has done well for the most part from general cardiovascular standpoint.  The patient currently denies chest pain.  She has stable dyspnea.  She has infrequent palpitations, no sustained dysrhythmic activity noted.  She is mostly normotensive, but does report hypertensive issues at times.  She will monitor that closely at home.  The patient feels that she is doing well otherwise and has no further cardiovascular issues or complaints at this time.    Current Outpatient Medications on File Prior to Visit   Medication Sig Dispense Refill   • Adalimumab 40 MG/0.8ML Prefilled Syringe Kit Inject 40 mg under the skin into the appropriate area as directed 1 (One) Time Per Week.     • Ascorbic Acid (VITAMIN C PO) Take  by mouth.     • aspirin 81 MG tablet Take 81 mg by mouth Daily.     • BIOTIN PO Take 100 mg by mouth Daily.     • cetirizine (zyrTEC) 10 MG tablet Take 10 mg by mouth Daily.     • Cholecalciferol (VITAMIN D-3) 1000 UNITS capsule Take 1 capsule by mouth daily.     • colesevelam (WELCHOL) 625 MG tablet Take 3 tablets by mouth 2 (two) times a day.     • cycloSPORINE (RESTASIS) 0.05 % ophthalmic emulsion Apply  to eye 2 (two) times a day. Instill 1 drop in each eye twice daily prn     •  hydroxychloroquine (PLAQUENIL) 200 MG tablet Take 1 tablet by mouth 2 (two) times a day.     • Misc Natural Products (NEURIVA PO) Take  by mouth.     • Multiple Vitamins-Minerals (ZINC PO) Take  by mouth.     • pantoprazole (PROTONIX) 40 MG EC tablet Take 40 mg by mouth Daily.  5   • Polyethylene Glycol 3350 (MIRALAX PO) Take  by mouth daily. Mix 1 capful (17gm) in 8 ounces of water , juice or tea and drink daily     • pseudoephedrine (SUDAFED) 30 MG tablet Take 30 mg by mouth Every 4 (Four) Hours As Needed for Congestion.     • rosuvastatin (CRESTOR) 10 MG tablet TAKE 1 TABLET EVERY DAY 90 tablet 3   • [DISCONTINUED] albuterol sulfate  (90 Base) MCG/ACT inhaler Inhale 2 puffs Every 4 (Four) Hours As Needed.       No current facility-administered medications on file prior to visit.       Codeine, Penicillins, and Sulfa antibiotics    Past Medical History:   Diagnosis Date   • Coronary artery disease    • Hyperlipidemia    • Hypertension    • Rheumatoid arthritis (HCC)        Social History     Socioeconomic History   • Marital status:    Tobacco Use   • Smoking status: Never Smoker   • Smokeless tobacco: Never Used   Substance and Sexual Activity   • Alcohol use: Yes     Comment: socially   • Drug use: No   • Sexual activity: Defer       Family History   Problem Relation Age of Onset   • Other Mother         CABG   • Coronary artery disease Mother    • Hyperlipidemia Mother    • Rheum arthritis Mother    • Other Sister         CABG   • Coronary artery disease Sister    • Diabetes Sister    • Hyperlipidemia Sister    • Other Other         CABG   • Coronary artery disease Other    • Other Other         CABG   • Coronary artery disease Other    • Diabetes Other    • Hyperlipidemia Other    • Rheum arthritis Other    • Breast cancer Neg Hx    • Ovarian cancer Neg Hx        Review of Systems   Constitutional: Negative.  Negative for chills, fatigue and fever.   HENT: Positive for congestion and rhinorrhea.  "Negative for sore throat.    Eyes: Positive for visual disturbance (glasses).   Respiratory: Negative.  Negative for chest tightness, shortness of breath and wheezing.    Cardiovascular: Negative.  Negative for chest pain, palpitations and leg swelling.   Gastrointestinal: Negative.    Endocrine: Negative.    Genitourinary: Negative.    Musculoskeletal: Positive for arthralgias, back pain and neck pain.   Skin: Negative.  Negative for rash and wound.   Allergic/Immunologic: Positive for environmental allergies.   Neurological: Positive for dizziness and headaches. Negative for weakness and numbness.   Hematological: Bruises/bleeds easily (bruises / bleeds).   Psychiatric/Behavioral: Positive for sleep disturbance (falling / staying asleep ).       Objective   Vitals:    03/02/22 0852   BP: 141/92   BP Location: Left arm   Patient Position: Sitting   Pulse: 66   SpO2: 99%   Weight: 60.2 kg (132 lb 12.8 oz)   Height: 157.5 cm (62\")      /92 (BP Location: Left arm, Patient Position: Sitting)   Pulse 66   Ht 157.5 cm (62\")   Wt 60.2 kg (132 lb 12.8 oz)   SpO2 99%   BMI 24.29 kg/m²    Lab Results (most recent)     None        Physical Exam  Vitals and nursing note reviewed.   Constitutional:       General: She is not in acute distress.     Appearance: She is well-developed.   HENT:      Head: Normocephalic and atraumatic.   Eyes:      Conjunctiva/sclera: Conjunctivae normal.      Pupils: Pupils are equal, round, and reactive to light.   Neck:      Vascular: No JVD.      Trachea: No tracheal deviation.   Cardiovascular:      Rate and Rhythm: Normal rate and regular rhythm.      Heart sounds: Normal heart sounds.   Pulmonary:      Effort: Pulmonary effort is normal.      Breath sounds: Normal breath sounds.   Abdominal:      General: Bowel sounds are normal. There is no distension.      Palpations: Abdomen is soft. There is no mass.      Tenderness: There is no abdominal tenderness. There is no guarding or " rebound.   Musculoskeletal:         General: No tenderness or deformity. Normal range of motion.      Cervical back: Normal range of motion and neck supple.   Skin:     General: Skin is warm and dry.      Coloration: Skin is not pale.      Findings: No erythema or rash.   Neurological:      Mental Status: She is alert and oriented to person, place, and time.   Psychiatric:         Behavior: Behavior normal.         Thought Content: Thought content normal.         Judgment: Judgment normal.           Procedure   Procedures       Assessment/Plan      Diagnosis Plan   1. Coronary artery disease involving native coronary artery of native heart without angina pectoris     2. Shortness of breath     3. Essential hypertension         1.  At this time, the patient appears to be doing well from general cardiovascular standpoint.  She has no symptoms of angina nor failure.  She has infrequent palpitations, certainly nothing to suggest sustained dysrhythmic activity.    2.  She is hypertensive today and will monitor that closely at home.  She will call for any ongoing issues.    3.  I would continue medical regimen without change.  She will call for complications.  We will continue to see her on 6-month intervals.            Advance Care Planning   ACP discussion was held with the patient during this visit. Patient has an advance directive (not in EMR), copy requested.          Electronically signed by:

## 2022-04-20 RX ORDER — ROSUVASTATIN CALCIUM 10 MG/1
TABLET, COATED ORAL
Qty: 90 TABLET | Refills: 3 | Status: SHIPPED | OUTPATIENT
Start: 2022-04-20

## 2022-07-25 ENCOUNTER — TRANSCRIBE ORDERS (OUTPATIENT)
Dept: ADMINISTRATIVE | Facility: HOSPITAL | Age: 67
End: 2022-07-25

## 2022-07-25 DIAGNOSIS — Z12.31 VISIT FOR SCREENING MAMMOGRAM: Primary | ICD-10-CM

## 2022-09-01 ENCOUNTER — OFFICE VISIT (OUTPATIENT)
Dept: CARDIOLOGY | Facility: CLINIC | Age: 67
End: 2022-09-01

## 2022-09-01 VITALS
HEART RATE: 72 BPM | DIASTOLIC BLOOD PRESSURE: 76 MMHG | WEIGHT: 131.4 LBS | HEIGHT: 62 IN | BODY MASS INDEX: 24.18 KG/M2 | OXYGEN SATURATION: 98 % | SYSTOLIC BLOOD PRESSURE: 122 MMHG

## 2022-09-01 DIAGNOSIS — R06.02 SHORTNESS OF BREATH: ICD-10-CM

## 2022-09-01 DIAGNOSIS — I25.10 CORONARY ARTERY DISEASE INVOLVING NATIVE CORONARY ARTERY OF NATIVE HEART, UNSPECIFIED WHETHER ANGINA PRESENT: Primary | ICD-10-CM

## 2022-09-01 DIAGNOSIS — E78.2 MIXED HYPERLIPIDEMIA: ICD-10-CM

## 2022-09-01 PROCEDURE — 93000 ELECTROCARDIOGRAM COMPLETE: CPT | Performed by: PHYSICIAN ASSISTANT

## 2022-09-01 PROCEDURE — 99214 OFFICE O/P EST MOD 30 MIN: CPT | Performed by: PHYSICIAN ASSISTANT

## 2022-09-01 NOTE — PROGRESS NOTES
Problem list     Subjective   Janice Disla is a 66 y.o. female     Chief Complaint   Patient presents with   • Coronary Artery Disease     6 month f/u   • Palpitations   Problem List:  1.) CAD  1.1) Stenting of the RCA and LAD per cath, 3-2012  1.2) Repeat [facility], 02/2017, in the setting of low-level symptoms and abnormal stress test.  Catheterization indicated patent stents, moderate disease of the LAD actually improved from time of catheterization in 2012, and significant ostial diagonal stenosis not felt an appropriate target percutaneously.  Medical management was recommended.  2.) Preserved systolic function  3.) HTN  4.) Dyslipidemia      5.) Rheumatoid arthritis    HPI  The patient presents back to the clinic today for follow-up.  Dyspnea has progressed slightly since last evaluation here.  She really has no chest pain.  She reports no PND or orthopnea.  She will have intermittent episodes of palpitations, mostly at night and only lasting seconds.  She has nothing to suggest sustained dysrhythmic activity.  Exercise capacity is poor, mostly with limitation secondary to rheumatoid arthritis.  She is pending follow-up with her rheumatologist tomorrow.  She does feel that she is now mostly normotensive.  She is tolerating medication without complication.  She has no further complaints at this time.    Current Outpatient Medications on File Prior to Visit   Medication Sig Dispense Refill   • Adalimumab 40 MG/0.8ML Prefilled Syringe Kit Inject 40 mg under the skin into the appropriate area as directed 1 (One) Time Per Week.     • Ascorbic Acid (VITAMIN C PO) Take  by mouth.     • aspirin 81 MG tablet Take 81 mg by mouth Daily.     • BIOTIN PO Take 100 mg by mouth Daily.     • cetirizine (zyrTEC) 10 MG tablet Take 10 mg by mouth Daily.     • Cholecalciferol (VITAMIN D-3) 1000 UNITS capsule Take 1 capsule by mouth daily.     • colesevelam (WELCHOL) 625 MG tablet Take 3 tablets by mouth 2 (two) times a day.     •  cycloSPORINE (RESTASIS) 0.05 % ophthalmic emulsion Apply  to eye 2 (two) times a day. Instill 1 drop in each eye twice daily prn     • hydroxychloroquine (PLAQUENIL) 200 MG tablet Take 1 tablet by mouth 2 (two) times a day.     • Multiple Vitamins-Minerals (ZINC PO) Take  by mouth.     • pantoprazole (PROTONIX) 40 MG EC tablet Take 40 mg by mouth Daily.  5   • Polyethylene Glycol 3350 (MIRALAX PO) Take  by mouth daily. Mix 1 capful (17gm) in 8 ounces of water , juice or tea and drink daily     • rosuvastatin (CRESTOR) 10 MG tablet TAKE 1 TABLET EVERY DAY 90 tablet 3   • [DISCONTINUED] Misc Natural Products (NEURIVA PO) Take  by mouth.     • [DISCONTINUED] pseudoephedrine (SUDAFED) 30 MG tablet Take 30 mg by mouth Every 4 (Four) Hours As Needed for Congestion.       No current facility-administered medications on file prior to visit.       Codeine, Penicillins, and Sulfa antibiotics    Past Medical History:   Diagnosis Date   • Coronary artery disease    • Hyperlipidemia    • Hypertension    • Rheumatoid arthritis (HCC)        Social History     Socioeconomic History   • Marital status:    Tobacco Use   • Smoking status: Never Smoker   • Smokeless tobacco: Never Used   Substance and Sexual Activity   • Alcohol use: Yes     Comment: socially   • Drug use: No   • Sexual activity: Defer       Family History   Problem Relation Age of Onset   • Other Mother         CABG   • Coronary artery disease Mother    • Hyperlipidemia Mother    • Rheum arthritis Mother    • Other Sister         CABG   • Coronary artery disease Sister    • Diabetes Sister    • Hyperlipidemia Sister    • Other Other         CABG   • Coronary artery disease Other    • Other Other         CABG   • Coronary artery disease Other    • Diabetes Other    • Hyperlipidemia Other    • Rheum arthritis Other    • Breast cancer Neg Hx    • Ovarian cancer Neg Hx        Review of Systems   Constitutional: Negative.  Negative for chills, diaphoresis, fatigue  "and fever.   HENT:        Sinus issues, ears popping and fluid   Eyes: Positive for visual disturbance (needs new glasses, cataracts).   Respiratory: Negative.  Negative for apnea, cough, chest tightness, shortness of breath and wheezing.    Cardiovascular: Positive for palpitations (rarely, noticed at night). Negative for chest pain and leg swelling.   Gastrointestinal: Negative.  Negative for abdominal pain, blood in stool, constipation, diarrhea, nausea and vomiting.   Endocrine: Negative.    Genitourinary: Negative.  Negative for hematuria.   Musculoskeletal: Positive for arthralgias, back pain, gait problem (off blaance), myalgias and neck pain.        RA   Skin: Negative.    Allergic/Immunologic: Negative.    Neurological: Positive for dizziness (quick movement, bending over and looking side to side). Negative for syncope, weakness, light-headedness, numbness and headaches.   Hematological: Bruises/bleeds easily.   Psychiatric/Behavioral: Positive for sleep disturbance (insomnia).       Objective   Vitals:    09/01/22 1007   BP: 122/76   BP Location: Left arm   Patient Position: Sitting   Pulse: 72   SpO2: 98%   Weight: 59.6 kg (131 lb 6.4 oz)   Height: 157.5 cm (62.01\")      /76 (BP Location: Left arm, Patient Position: Sitting)   Pulse 72   Ht 157.5 cm (62.01\")   Wt 59.6 kg (131 lb 6.4 oz)   SpO2 98%   BMI 24.03 kg/m²    Lab Results (most recent)     None        Physical Exam  Vitals and nursing note reviewed.   Constitutional:       General: She is not in acute distress.     Appearance: She is well-developed.   HENT:      Head: Normocephalic and atraumatic.   Eyes:      Conjunctiva/sclera: Conjunctivae normal.      Pupils: Pupils are equal, round, and reactive to light.   Neck:      Vascular: No JVD.      Trachea: No tracheal deviation.   Cardiovascular:      Rate and Rhythm: Normal rate and regular rhythm.      Heart sounds: Normal heart sounds.   Pulmonary:      Effort: Pulmonary effort is " normal.      Breath sounds: Normal breath sounds.   Abdominal:      General: Bowel sounds are normal. There is no distension.      Palpations: Abdomen is soft. There is no mass.      Tenderness: There is no abdominal tenderness. There is no guarding or rebound.   Musculoskeletal:         General: No tenderness or deformity. Normal range of motion.      Cervical back: Normal range of motion and neck supple.   Skin:     General: Skin is warm and dry.      Coloration: Skin is not pale.      Findings: No erythema or rash.   Neurological:      Mental Status: She is alert and oriented to person, place, and time.   Psychiatric:         Behavior: Behavior normal.         Thought Content: Thought content normal.         Judgment: Judgment normal.           Procedure     ECG 12 Lead    Date/Time: 9/1/2022 10:13 AM  Performed by: Sukumar Shepherd PA  Authorized by: Sukumar Shepherd PA   Comparison: compared with previous ECG from 9/1/2021  Comparison to previous ECG: Sinus rhythm, rate 66, normal axis, no acute changes noted.                 Assessment & Plan      Diagnosis Plan   1. Coronary artery disease involving native coronary artery of native heart, unspecified whether angina present  CBC & Differential    Comprehensive Metabolic Panel    Lipid Panel    TSH    Adult Transthoracic Echo Complete W/ Cont if Necessary Per Protocol    Stress Test With Myocardial Perfusion One Day   2. Shortness of breath  CBC & Differential    Comprehensive Metabolic Panel    Lipid Panel    TSH    Adult Transthoracic Echo Complete W/ Cont if Necessary Per Protocol    Stress Test With Myocardial Perfusion One Day   3. Mixed hyperlipidemia  CBC & Differential    Comprehensive Metabolic Panel    Lipid Panel    TSH    Adult Transthoracic Echo Complete W/ Cont if Necessary Per Protocol    Stress Test With Myocardial Perfusion One Day       1.  I would like to schedule the patient for repeat noninvasive cardiac evaluation.  She has had ongoing  dyspnea which has been slightly progressive since last evaluation.  She has not had any cardiac testing in approximately 3 years.  We will repeat nuclear stress test for ischemia assessment.  With rheumatoid arthritis status, I do not feel she would be a good candidate for treadmill protocol and we will schedule her for Lexiscan.    2.  I would also schedule for an echo to evaluate LV size and function, valvular morphologies, and cardiac structure otherwise.    3.  We will repeat routine laboratories as above.  We have requested a copy of those be sent to her primary care provider as well.    4.  I would make no adjustments in medications at this time.  We will see her back to review study results and recommended further at that time.  She will call for any issues prior to follow-up.         Janice LEW Naif  reports that she has never smoked. She has never used smokeless tobacco..    Advance Care Planning   ACP discussion was held with the patient during this visit. Patient has an advance directive (not in EMR), copy requested.       Electronically signed by:

## 2022-09-27 ENCOUNTER — HOSPITAL ENCOUNTER (OUTPATIENT)
Dept: MAMMOGRAPHY | Facility: HOSPITAL | Age: 67
Discharge: HOME | End: 2022-09-27
Admitting: OBSTETRICS & GYNECOLOGY

## 2022-09-27 DIAGNOSIS — Z12.31 VISIT FOR SCREENING MAMMOGRAM: ICD-10-CM

## 2022-09-27 PROCEDURE — 77067 SCR MAMMO BI INCL CAD: CPT

## 2022-09-27 PROCEDURE — 77063 BREAST TOMOSYNTHESIS BI: CPT | Performed by: RADIOLOGY

## 2022-09-27 PROCEDURE — 77063 BREAST TOMOSYNTHESIS BI: CPT

## 2022-09-27 PROCEDURE — 77067 SCR MAMMO BI INCL CAD: CPT | Performed by: RADIOLOGY

## 2022-10-25 ENCOUNTER — APPOINTMENT (OUTPATIENT)
Dept: CARDIOLOGY | Facility: HOSPITAL | Age: 67
End: 2022-10-25

## 2022-10-25 ENCOUNTER — HOSPITAL ENCOUNTER (OUTPATIENT)
Dept: CARDIOLOGY | Facility: HOSPITAL | Age: 67
Discharge: HOME | End: 2022-10-25

## 2022-10-25 ENCOUNTER — LAB (OUTPATIENT)
Dept: LAB | Facility: HOSPITAL | Age: 67
End: 2022-10-25

## 2022-10-25 DIAGNOSIS — E78.2 MIXED HYPERLIPIDEMIA: ICD-10-CM

## 2022-10-25 DIAGNOSIS — R06.02 SHORTNESS OF BREATH: ICD-10-CM

## 2022-10-25 DIAGNOSIS — I25.10 CORONARY ARTERY DISEASE INVOLVING NATIVE CORONARY ARTERY OF NATIVE HEART, UNSPECIFIED WHETHER ANGINA PRESENT: ICD-10-CM

## 2022-10-25 PROCEDURE — 0 TECHNETIUM SESTAMIBI: Performed by: INTERNAL MEDICINE

## 2022-10-25 PROCEDURE — 93306 TTE W/DOPPLER COMPLETE: CPT

## 2022-10-25 PROCEDURE — 78452 HT MUSCLE IMAGE SPECT MULT: CPT

## 2022-10-25 PROCEDURE — 78452 HT MUSCLE IMAGE SPECT MULT: CPT | Performed by: INTERNAL MEDICINE

## 2022-10-25 PROCEDURE — 93018 CV STRESS TEST I&R ONLY: CPT | Performed by: INTERNAL MEDICINE

## 2022-10-25 PROCEDURE — 93306 TTE W/DOPPLER COMPLETE: CPT | Performed by: INTERNAL MEDICINE

## 2022-10-25 PROCEDURE — 93017 CV STRESS TEST TRACING ONLY: CPT

## 2022-10-25 PROCEDURE — 85025 COMPLETE CBC W/AUTO DIFF WBC: CPT | Performed by: PHYSICIAN ASSISTANT

## 2022-10-25 PROCEDURE — 84443 ASSAY THYROID STIM HORMONE: CPT | Performed by: PHYSICIAN ASSISTANT

## 2022-10-25 PROCEDURE — 80061 LIPID PANEL: CPT | Performed by: PHYSICIAN ASSISTANT

## 2022-10-25 PROCEDURE — A9500 TC99M SESTAMIBI: HCPCS | Performed by: INTERNAL MEDICINE

## 2022-10-25 PROCEDURE — 25010000002 REGADENOSON 0.4 MG/5ML SOLUTION: Performed by: PHYSICIAN ASSISTANT

## 2022-10-25 PROCEDURE — 80053 COMPREHEN METABOLIC PANEL: CPT | Performed by: PHYSICIAN ASSISTANT

## 2022-10-25 RX ADMIN — TECHNETIUM TC 99M SESTAMIBI 1 DOSE: 1 INJECTION INTRAVENOUS at 14:12

## 2022-10-25 RX ADMIN — TECHNETIUM TC 99M SESTAMIBI 1 DOSE: 1 INJECTION INTRAVENOUS at 11:12

## 2022-10-25 RX ADMIN — REGADENOSON 0.4 MG: 0.08 INJECTION, SOLUTION INTRAVENOUS at 13:57

## 2022-10-27 LAB
BH CV REST NUCLEAR ISOTOPE DOSE: 10 MCI
BH CV STRESS COMMENTS STAGE 1: NORMAL
BH CV STRESS DOSE REGADENOSON STAGE 1: 0.4
BH CV STRESS DURATION MIN STAGE 1: 0
BH CV STRESS DURATION SEC STAGE 1: 10
BH CV STRESS NUCLEAR ISOTOPE DOSE: 30 MCI
BH CV STRESS PROTOCOL 1: NORMAL
BH CV STRESS RECOVERY BP: NORMAL MMHG
BH CV STRESS RECOVERY HR: 77 BPM
BH CV STRESS STAGE 1: 1
MAXIMAL PREDICTED HEART RATE: 151 BPM
PERCENT MAX PREDICTED HR: 66.23 %
STRESS BASELINE BP: NORMAL MMHG
STRESS BASELINE HR: 60 BPM
STRESS PERCENT HR: 78 %
STRESS POST PEAK BP: NORMAL MMHG
STRESS POST PEAK HR: 100 BPM
STRESS TARGET HR: 128 BPM

## 2022-10-31 ENCOUNTER — TELEPHONE (OUTPATIENT)
Dept: CARDIOLOGY | Facility: CLINIC | Age: 67
End: 2022-10-31

## 2022-10-31 NOTE — TELEPHONE ENCOUNTER
----- Message from Fahad Cochran sent at 10/31/2022  9:27 AM EDT -----    ----- Message -----  From: Sukumar Shepherd PA  Sent: 10/28/2022  12:58 PM EDT  To: Kimberley Lawson MA    Routine follow-up.  ----- Message -----  From: Thuan Vo MD  Sent: 10/27/2022   8:58 AM EDT  To: LEO Bess

## 2022-11-02 ENCOUNTER — TELEPHONE (OUTPATIENT)
Dept: CARDIOLOGY | Facility: CLINIC | Age: 67
End: 2022-11-02

## 2022-11-02 DIAGNOSIS — I25.10 CORONARY ARTERY DISEASE INVOLVING NATIVE CORONARY ARTERY OF NATIVE HEART, UNSPECIFIED WHETHER ANGINA PRESENT: Primary | ICD-10-CM

## 2022-11-02 DIAGNOSIS — E78.2 MIXED HYPERLIPIDEMIA: ICD-10-CM

## 2022-11-02 RX ORDER — EZETIMIBE 10 MG/1
10 TABLET ORAL DAILY
Qty: 30 TABLET | Refills: 11 | Status: SHIPPED | OUTPATIENT
Start: 2022-11-02

## 2022-11-02 NOTE — TELEPHONE ENCOUNTER
Patient returned call and given recommendations. She will agreeable to start Zetia 10  And repeat labs in 3 months.     HUB to read    Patient left message that she missed a call and would like a call back. Left detailed message for patient of recommendations. She will call back if she would like to try Zetia or have any question.      ----- Message from LEO Bess sent at 10/28/2022  2:31 PM EDT -----  LDL still not to goal.  Would recommend consideration for Zetia and continuing lipid management medications otherwise.  Repeat in 3 months if patient decides to do the same.

## 2022-11-02 NOTE — TELEPHONE ENCOUNTER
----- Message from LEO Bess sent at 10/28/2022  2:31 PM EDT -----  LDL still not to goal.  Would recommend consideration for Zetia and continuing lipid management medications otherwise.  Repeat in 3 months if patient decides to do the same.  ----- Message -----  From: Lab, Background User  Sent: 10/25/2022   2:38 PM EDT  To: LEO Bess

## 2022-11-13 LAB
AORTIC DIMENSIONLESS INDEX: 0.8 (DI)
BH CV ECHO MEAS - ACS: 1.67 CM
BH CV ECHO MEAS - AO MAX PG: 5.1 MMHG
BH CV ECHO MEAS - AO MEAN PG: 3.1 MMHG
BH CV ECHO MEAS - AO ROOT DIAM: 2.6 CM
BH CV ECHO MEAS - AO V2 MAX: 112.7 CM/SEC
BH CV ECHO MEAS - AO V2 VTI: 29.3 CM
BH CV ECHO MEAS - AVA(I,D): 2.28 CM2
BH CV ECHO MEAS - EDV(CUBED): 45.1 ML
BH CV ECHO MEAS - EDV(MOD-SP4): 38.1 ML
BH CV ECHO MEAS - EF(MOD-SP4): 73 %
BH CV ECHO MEAS - EF_3D-VOL: 63 %
BH CV ECHO MEAS - ESV(CUBED): 10.2 ML
BH CV ECHO MEAS - ESV(MOD-SP4): 10.3 ML
BH CV ECHO MEAS - FS: 39 %
BH CV ECHO MEAS - IVS/LVPW: 0.87 CM
BH CV ECHO MEAS - IVSD: 0.93 CM
BH CV ECHO MEAS - LA A2CS (ATRIAL LENGTH): 4.3 CM
BH CV ECHO MEAS - LA A4C LENGTH: 4.2 CM
BH CV ECHO MEAS - LA DIMENSION: 3 CM
BH CV ECHO MEAS - LAT PEAK E' VEL: 6.3 CM/SEC
BH CV ECHO MEAS - LV DIASTOLIC VOL/BSA (35-75): 23.9 CM2
BH CV ECHO MEAS - LV MASS(C)D: 105.6 GRAMS
BH CV ECHO MEAS - LV MAX PG: 2.9 MMHG
BH CV ECHO MEAS - LV MEAN PG: 1.63 MMHG
BH CV ECHO MEAS - LV SYSTOLIC VOL/BSA (12-30): 6.4 CM2
BH CV ECHO MEAS - LV V1 MAX: 85.3 CM/SEC
BH CV ECHO MEAS - LV V1 VTI: 20.9 CM
BH CV ECHO MEAS - LVIDD: 3.6 CM
BH CV ECHO MEAS - LVIDS: 2.17 CM
BH CV ECHO MEAS - LVOT AREA: 3.2 CM2
BH CV ECHO MEAS - LVOT DIAM: 2.02 CM
BH CV ECHO MEAS - LVPWD: 1.07 CM
BH CV ECHO MEAS - MED PEAK E' VEL: 5.2 CM/SEC
BH CV ECHO MEAS - MV A MAX VEL: 69 CM/SEC
BH CV ECHO MEAS - MV DEC SLOPE: 330.9 CM/SEC2
BH CV ECHO MEAS - MV DEC TIME: 0.22 MSEC
BH CV ECHO MEAS - MV E MAX VEL: 69.4 CM/SEC
BH CV ECHO MEAS - MV E/A: 1.01
BH CV ECHO MEAS - MV MAX PG: 2.48 MMHG
BH CV ECHO MEAS - MV MEAN PG: 0.96 MMHG
BH CV ECHO MEAS - MV P1/2T: 67.3 MSEC
BH CV ECHO MEAS - MV V2 VTI: 25.4 CM
BH CV ECHO MEAS - MVA(P1/2T): 3.3 CM2
BH CV ECHO MEAS - MVA(VTI): 2.6 CM2
BH CV ECHO MEAS - PA V2 MAX: 71.5 CM/SEC
BH CV ECHO MEAS - RAP SYSTOLE: 10 MMHG
BH CV ECHO MEAS - RV MAX PG: 1.62 MMHG
BH CV ECHO MEAS - RV V1 MAX: 63.7 CM/SEC
BH CV ECHO MEAS - RV V1 VTI: 17.1 CM
BH CV ECHO MEAS - RVDD: 2.9 CM
BH CV ECHO MEAS - RVSP: 33.1 MMHG
BH CV ECHO MEAS - SI(MOD-SP4): 17.4 ML/M2
BH CV ECHO MEAS - SV(LVOT): 66.9 ML
BH CV ECHO MEAS - SV(MOD-SP4): 27.8 ML
BH CV ECHO MEAS - TR MAX PG: 23.1 MMHG
BH CV ECHO MEAS - TR MAX VEL: 240.3 CM/SEC
BH CV ECHO MEASUREMENTS AVERAGE E/E' RATIO: 12.07
LEFT ATRIUM VOLUME INDEX: 11.3 ML/M2
LEFT ATRIUM VOLUME: 18 ML
MAXIMAL PREDICTED HEART RATE: 151 BPM
STRESS TARGET HR: 128 BPM

## 2022-11-17 ENCOUNTER — TELEPHONE (OUTPATIENT)
Dept: CARDIOLOGY | Facility: CLINIC | Age: 67
End: 2022-11-17

## 2022-11-17 NOTE — TELEPHONE ENCOUNTER
For the HUB to read to pt:     ECHO  Called patient to notify of no acute findings or abnormalities. Keep follow up as scheduled. If you have any problem between now and then give our office a call.   ----- Message from Kimberley Lawson MA sent at 11/16/2022 10:35 AM EST -----    ----- Message -----  From: Sukumar Shepherd PA  Sent: 11/15/2022   8:53 AM EST  To: Kimberley Lawson MA    Routine follow-up from cardiac standpoint.  Please copy forward to PCP.  ----- Message -----  From: Thuan Vo MD  Sent: 11/13/2022  12:10 PM EST  To: LEO Bess

## 2023-08-30 ENCOUNTER — TRANSCRIBE ORDERS (OUTPATIENT)
Dept: ADMINISTRATIVE | Facility: HOSPITAL | Age: 68
End: 2023-08-30
Payer: MEDICARE

## 2023-08-30 DIAGNOSIS — Z12.31 ENCOUNTER FOR SCREENING MAMMOGRAM FOR BREAST CANCER: Primary | ICD-10-CM

## 2023-10-25 RX ORDER — EZETIMIBE 10 MG/1
10 TABLET ORAL DAILY
Qty: 90 TABLET | Refills: 3 | Status: SHIPPED | OUTPATIENT
Start: 2023-10-25

## 2024-02-15 RX ORDER — ROSUVASTATIN CALCIUM 10 MG/1
10 TABLET, COATED ORAL DAILY
Qty: 10 TABLET | Refills: 0 | Status: SHIPPED | OUTPATIENT
Start: 2024-02-15 | End: 2024-02-15 | Stop reason: SDUPTHER

## 2024-02-15 RX ORDER — ROSUVASTATIN CALCIUM 10 MG/1
10 TABLET, COATED ORAL DAILY
Qty: 90 TABLET | Refills: 3 | Status: SHIPPED | OUTPATIENT
Start: 2024-02-15

## 2024-03-13 ENCOUNTER — OFFICE VISIT (OUTPATIENT)
Dept: CARDIOLOGY | Facility: CLINIC | Age: 69
End: 2024-03-13
Payer: MEDICARE

## 2024-03-13 VITALS
WEIGHT: 127.4 LBS | BODY MASS INDEX: 23.45 KG/M2 | DIASTOLIC BLOOD PRESSURE: 77 MMHG | HEART RATE: 70 BPM | OXYGEN SATURATION: 99 % | SYSTOLIC BLOOD PRESSURE: 132 MMHG | HEIGHT: 62 IN

## 2024-03-13 DIAGNOSIS — R06.02 SHORTNESS OF BREATH: ICD-10-CM

## 2024-03-13 DIAGNOSIS — I10 ESSENTIAL HYPERTENSION: ICD-10-CM

## 2024-03-13 DIAGNOSIS — I25.10 CORONARY ARTERY DISEASE INVOLVING NATIVE CORONARY ARTERY OF NATIVE HEART WITHOUT ANGINA PECTORIS: Primary | ICD-10-CM

## 2024-03-13 PROCEDURE — 3078F DIAST BP <80 MM HG: CPT | Performed by: PHYSICIAN ASSISTANT

## 2024-03-13 PROCEDURE — 1159F MED LIST DOCD IN RCRD: CPT | Performed by: PHYSICIAN ASSISTANT

## 2024-03-13 PROCEDURE — 3075F SYST BP GE 130 - 139MM HG: CPT | Performed by: PHYSICIAN ASSISTANT

## 2024-03-13 PROCEDURE — 1160F RVW MEDS BY RX/DR IN RCRD: CPT | Performed by: PHYSICIAN ASSISTANT

## 2024-03-13 PROCEDURE — 99213 OFFICE O/P EST LOW 20 MIN: CPT | Performed by: PHYSICIAN ASSISTANT

## 2024-03-13 PROCEDURE — 93000 ELECTROCARDIOGRAM COMPLETE: CPT | Performed by: PHYSICIAN ASSISTANT

## 2024-03-13 RX ORDER — PSEUDOEPHEDRINE HCL 120 MG/1
120 TABLET, FILM COATED, EXTENDED RELEASE ORAL DAILY
COMMUNITY

## 2024-03-13 RX ORDER — ERGOCALCIFEROL 1.25 MG/1
50000 CAPSULE ORAL
COMMUNITY
Start: 2024-01-11

## 2024-03-13 NOTE — PROGRESS NOTES
Problem list     Subjective   Janice Disla is a 68 y.o. female     Chief Complaint   Patient presents with    Follow-up     6 month follow up   Problem List:  1.) CAD  1.1) Stenting of the RCA and LAD per cath, 3-2012  1.2) Repeat [facility], 02/2017, in the setting of low-level symptoms and abnormal stress test.  Catheterization indicated patent stents, moderate disease of the LAD actually improved from time of catheterization in 2012, and significant ostial diagonal stenosis not felt an appropriate target percutaneously.  Medical management was recommended.  2.) Preserved systolic function  3.) HTN  4.) Dyslipidemia      5.) Rheumatoid arthritis    HPI  The patient presents in clinic today for routine follow-up.  Unfortunately, approximately 4 months ago, she had an accident which resulted in fracture of her right calcaneus.  She has had extensive physical therapy and medical monitoring from orthopedic surgery in that regard.  She had no surgical intervention for that injury.  Despite this, she has done well from cardiovascular standpoint.  She had no cardiac issues during her recovery time.  She currently has no chest pain.  She has stable dyspnea.  She has no significant palpitations, which has been an issue for her in the past.  She denies failure nor dysrhythmic symptoms otherwise.  To her knowledge she typically is normotensive.  In 2022, she did have a stress test and an echo evaluation.  Stress test indicated no evidence of ischemia while echo indicated normal systolic function with no significant valvular nor structural abnormalities.  She has no further complaints at this time.    Current Outpatient Medications on File Prior to Visit   Medication Sig Dispense Refill    Adalimumab 40 MG/0.8ML Prefilled Syringe Kit Inject 0.8 mL under the skin into the appropriate area as directed 1 (One) Time Per Week.      Ascorbic Acid (VITAMIN C PO) Take  by mouth.      aspirin 81 MG tablet Take 1 tablet by mouth Daily.       BIOTIN PO Take 100 mg by mouth Daily.      cetirizine (zyrTEC) 10 MG tablet Take 1 tablet by mouth Daily.      coenzyme Q10 100 MG capsule Take 1 capsule by mouth Daily.      colesevelam (WELCHOL) 625 MG tablet Take 3 tablets by mouth 2 (Two) Times a Day.      cycloSPORINE (RESTASIS) 0.05 % ophthalmic emulsion Apply  to eye 2 (two) times a day. Instill 1 drop in each eye twice daily prn      ezetimibe (ZETIA) 10 MG tablet TAKE 1 TABLET BY MOUTH EVERY DAY 90 tablet 3    hydroxychloroquine (PLAQUENIL) 200 MG tablet Take 1 tablet by mouth 2 (Two) Times a Day.      Multiple Vitamins-Minerals (ZINC PO) Take  by mouth.      pantoprazole (PROTONIX) 40 MG EC tablet Take 1 tablet by mouth Daily.  5    Polyethylene Glycol 3350 (MIRALAX PO) Take  by mouth daily. Mix 1 capful (17gm) in 8 ounces of water , juice or tea and drink daily      pseudoephedrine (SUDAFED) 120 MG 12 hr tablet Take 1 tablet by mouth Daily.      rosuvastatin (CRESTOR) 10 MG tablet Take 1 tablet by mouth Daily. 90 tablet 3    vitamin D (ERGOCALCIFEROL) 1.25 MG (58705 UT) capsule capsule Take 1 capsule by mouth Every 7 (Seven) Days.      [DISCONTINUED] Cholecalciferol (VITAMIN D-3) 1000 UNITS capsule Take 1,000 Units by mouth Daily.       No current facility-administered medications on file prior to visit.       Codeine, Penicillins, and Sulfa antibiotics    Past Medical History:   Diagnosis Date    Calcaneal fracture 10/2023    Coronary artery disease     Hyperlipidemia     Hypertension     Rheumatoid arthritis        Social History     Socioeconomic History    Marital status:    Tobacco Use    Smoking status: Never    Smokeless tobacco: Never   Substance and Sexual Activity    Alcohol use: Yes     Comment: socially    Drug use: No    Sexual activity: Defer       Family History   Problem Relation Age of Onset    Other Mother         CABG    Coronary artery disease Mother     Hyperlipidemia Mother     Rheum arthritis Mother     Other Sister          "CABG    Coronary artery disease Sister     Diabetes Sister     Hyperlipidemia Sister     Other Other         CABG    Coronary artery disease Other     Other Other         CABG    Coronary artery disease Other     Diabetes Other     Hyperlipidemia Other     Rheum arthritis Other     Breast cancer Neg Hx     Ovarian cancer Neg Hx        Review of Systems   Constitutional: Negative.  Negative for chills, diaphoresis, fatigue and fever.   HENT: Negative.     Eyes: Negative.  Negative for visual disturbance.   Respiratory: Negative.  Negative for apnea, cough, chest tightness, shortness of breath and wheezing.    Cardiovascular: Negative.  Negative for chest pain, palpitations and leg swelling.   Gastrointestinal: Negative.  Negative for abdominal pain and blood in stool.   Endocrine: Negative.    Genitourinary: Negative.  Negative for hematuria.   Musculoskeletal:  Positive for neck stiffness. Negative for arthralgias, back pain, myalgias and neck pain.   Skin: Negative.  Negative for rash and wound.   Allergic/Immunologic: Positive for environmental allergies (seasonal). Negative for food allergies.   Neurological:  Positive for light-headedness. Negative for dizziness, syncope, weakness, numbness and headaches.   Hematological:  Bruises/bleeds easily.   Psychiatric/Behavioral: Negative.  Negative for sleep disturbance.        Objective   Vitals:    03/13/24 1018   BP: 132/77   Pulse: 70   SpO2: 99%   Weight: 57.8 kg (127 lb 6.4 oz)   Height: 157.5 cm (62\")      /77   Pulse 70   Ht 157.5 cm (62\")   Wt 57.8 kg (127 lb 6.4 oz)   SpO2 99%   BMI 23.30 kg/m²    Lab Results (most recent)       None          Physical Exam  Vitals and nursing note reviewed.   Constitutional:       General: She is not in acute distress.     Appearance: She is well-developed.   HENT:      Head: Normocephalic and atraumatic.   Eyes:      Conjunctiva/sclera: Conjunctivae normal.      Pupils: Pupils are equal, round, and reactive to " light.   Neck:      Vascular: No JVD.      Trachea: No tracheal deviation.   Cardiovascular:      Rate and Rhythm: Normal rate and regular rhythm.      Heart sounds: Normal heart sounds.   Pulmonary:      Effort: Pulmonary effort is normal.      Breath sounds: Normal breath sounds.   Abdominal:      General: Bowel sounds are normal. There is no distension.      Palpations: Abdomen is soft. There is no mass.      Tenderness: There is no abdominal tenderness. There is no guarding or rebound.   Musculoskeletal:         General: No tenderness or deformity. Normal range of motion.      Cervical back: Normal range of motion and neck supple.   Skin:     General: Skin is warm and dry.      Coloration: Skin is not pale.      Findings: No erythema or rash.   Neurological:      Mental Status: She is alert and oriented to person, place, and time.   Psychiatric:         Behavior: Behavior normal.         Thought Content: Thought content normal.         Judgment: Judgment normal.           Procedure     ECG 12 Lead    Date/Time: 3/13/2024 11:23 AM  Performed by: Sukumar Shepherd PA    Authorized by: Sukumar Shepherd PA  Comparison: compared with previous ECG from 9/1/2022             Assessment & Plan      Diagnosis Plan   1. Coronary artery disease involving native coronary artery of native heart without angina pectoris        2. Shortness of breath        3. Essential hypertension          1.  At this time, the patient appears to be doing fairly well from general cardiovascular standpoint.  She denies symptoms of angina.  Dyspnea is at baseline.  She has no further concerns nor issues from general cardiovascular standpoint at this time.    2.  Blood pressures remain normotensive when monitored at home.  She will follow this closely and call for complications.    3.  As the patient is doing well, nothing further.  We will make no adjustments in medications.  Continue to see her on 6-month intervals.           Advance Care  Planning   ACP discussion was held with the patient during this visit. Patient has an advance directive (not in EMR), copy requested.         Electronically signed by:

## 2024-11-06 RX ORDER — EZETIMIBE 10 MG/1
TABLET ORAL
Qty: 90 TABLET | Refills: 0 | Status: SHIPPED | OUTPATIENT
Start: 2024-11-06

## 2024-12-03 ENCOUNTER — TRANSCRIBE ORDERS (OUTPATIENT)
Dept: ADMINISTRATIVE | Facility: HOSPITAL | Age: 69
End: 2024-12-03
Payer: MEDICARE

## 2024-12-03 DIAGNOSIS — Z12.31 SCREENING MAMMOGRAM FOR BREAST CANCER: Primary | ICD-10-CM

## 2024-12-16 ENCOUNTER — OFFICE VISIT (OUTPATIENT)
Dept: CARDIOLOGY | Facility: CLINIC | Age: 69
End: 2024-12-16
Payer: MEDICARE

## 2024-12-16 VITALS
OXYGEN SATURATION: 99 % | HEART RATE: 67 BPM | SYSTOLIC BLOOD PRESSURE: 145 MMHG | HEIGHT: 62 IN | WEIGHT: 132 LBS | BODY MASS INDEX: 24.29 KG/M2 | DIASTOLIC BLOOD PRESSURE: 81 MMHG

## 2024-12-16 DIAGNOSIS — I10 ESSENTIAL HYPERTENSION: ICD-10-CM

## 2024-12-16 DIAGNOSIS — I25.10 CORONARY ARTERY DISEASE INVOLVING NATIVE CORONARY ARTERY OF NATIVE HEART WITHOUT ANGINA PECTORIS: Primary | ICD-10-CM

## 2024-12-16 DIAGNOSIS — R06.02 SHORTNESS OF BREATH: ICD-10-CM

## 2024-12-16 PROCEDURE — 1159F MED LIST DOCD IN RCRD: CPT | Performed by: PHYSICIAN ASSISTANT

## 2024-12-16 PROCEDURE — 3077F SYST BP >= 140 MM HG: CPT | Performed by: PHYSICIAN ASSISTANT

## 2024-12-16 PROCEDURE — 1160F RVW MEDS BY RX/DR IN RCRD: CPT | Performed by: PHYSICIAN ASSISTANT

## 2024-12-16 PROCEDURE — 3079F DIAST BP 80-89 MM HG: CPT | Performed by: PHYSICIAN ASSISTANT

## 2024-12-16 PROCEDURE — 99213 OFFICE O/P EST LOW 20 MIN: CPT | Performed by: PHYSICIAN ASSISTANT

## 2024-12-16 RX ORDER — MEDROXYPROGESTERONE ACETATE 150 MG/ML
INJECTION, SUSPENSION INTRAMUSCULAR
COMMUNITY
Start: 2024-12-10

## 2024-12-16 NOTE — PROGRESS NOTES
Problem list     Subjective   Janice Disla is a 69 y.o. female     Chief Complaint   Patient presents with    Follow-up     8 month follow up    Problem List:  1.) CAD  1.1) Stenting of the RCA and LAD per cath, 3-2012  1.2) Repeat [facility], 02/2017, in the setting of low-level symptoms and abnormal stress test.  Catheterization indicated patent stents, moderate disease of the LAD actually improved from time of catheterization in 2012, and significant ostial diagonal stenosis not felt an appropriate target percutaneously.  Medical management was recommended.  2.) Preserved systolic function  3.) HTN  4.) Dyslipidemia      5.) Rheumatoid arthritis       HPI  The patient presents in clinic today for routine evaluation and follow-up.  For the most part, she has done well from cardiovascular standpoint since last appointment.  She currently denies angina.  She has stable dyspnea.  She has no failure nor dysrhythmic symptoms.  She is slightly hypertensive today but mostly normotensive when monitored at home.  She tolerates medications without complications.  She has no further complaints.    Current Outpatient Medications on File Prior to Visit   Medication Sig Dispense Refill    Ascorbic Acid (VITAMIN C PO) Take  by mouth.      aspirin 81 MG tablet Take 1 tablet by mouth Daily.      BIOTIN PO Take 100 mg by mouth Daily.      cetirizine (zyrTEC) 10 MG tablet Take 1 tablet by mouth Daily.      coenzyme Q10 100 MG capsule Take 1 capsule by mouth Daily.      colesevelam (WELCHOL) 625 MG tablet Take 3 tablets by mouth 2 (Two) Times a Day.      cycloSPORINE (RESTASIS) 0.05 % ophthalmic emulsion Apply  to eye 2 (two) times a day. Instill 1 drop in each eye twice daily prn      Enbrel SureClick 50 MG/ML solution auto-injector       ezetimibe (ZETIA) 10 MG tablet TAKE 1 TABLET BY MOUTH EVERY EVENING TO LOWER CHOLESTEROL AND TRIGLYCERIDES 90 tablet 0    hydroxychloroquine (PLAQUENIL) 200 MG tablet Take 1 tablet by mouth 2 (Two)  Times a Day.      Multiple Vitamins-Minerals (ZINC PO) Take  by mouth.      pantoprazole (PROTONIX) 40 MG EC tablet Take 1 tablet by mouth Daily.  5    Polyethylene Glycol 3350 (MIRALAX PO) Take  by mouth daily. Mix 1 capful (17gm) in 8 ounces of water , juice or tea and drink daily      pseudoephedrine (SUDAFED) 120 MG 12 hr tablet Take 1 tablet by mouth Daily.      rosuvastatin (CRESTOR) 10 MG tablet Take 1 tablet by mouth Daily. 90 tablet 3    [DISCONTINUED] Adalimumab 40 MG/0.8ML Prefilled Syringe Kit Inject 0.8 mL under the skin into the appropriate area as directed 1 (One) Time Per Week.      [DISCONTINUED] vitamin D (ERGOCALCIFEROL) 1.25 MG (03805 UT) capsule capsule Take 1 capsule by mouth Every 7 (Seven) Days.       No current facility-administered medications on file prior to visit.       Codeine, Penicillins, and Sulfa antibiotics    Past Medical History:   Diagnosis Date    Calcaneal fracture 10/2023    Coronary artery disease     Hyperlipidemia     Hypertension     Rheumatoid arthritis        Social History     Socioeconomic History    Marital status:    Tobacco Use    Smoking status: Never    Smokeless tobacco: Never   Substance and Sexual Activity    Alcohol use: Yes     Comment: socially    Drug use: No    Sexual activity: Defer       Family History   Problem Relation Age of Onset    Other Mother         CABG    Coronary artery disease Mother     Hyperlipidemia Mother     Rheum arthritis Mother     Other Sister         CABG    Coronary artery disease Sister     Diabetes Sister     Hyperlipidemia Sister     Other Other         CABG    Coronary artery disease Other     Other Other         CABG    Coronary artery disease Other     Diabetes Other     Hyperlipidemia Other     Rheum arthritis Other     Breast cancer Neg Hx     Ovarian cancer Neg Hx        Review of Systems   Constitutional: Negative.  Negative for chills, diaphoresis, fatigue and fever.   HENT: Negative.     Eyes: Negative.   "Negative for visual disturbance.   Respiratory: Negative.  Negative for apnea, cough, chest tightness, shortness of breath and wheezing.    Cardiovascular: Negative.  Negative for chest pain, palpitations and leg swelling.   Gastrointestinal: Negative.  Negative for abdominal pain and blood in stool.   Endocrine: Negative.    Genitourinary: Negative.  Negative for hematuria.   Musculoskeletal:  Positive for arthralgias, back pain and gait problem (balance issues). Negative for myalgias, neck pain and neck stiffness.   Skin: Negative.  Negative for rash and wound.   Allergic/Immunologic: Negative.  Negative for environmental allergies and food allergies.   Neurological:  Negative for dizziness, syncope, weakness, light-headedness, numbness and headaches.   Hematological:  Bruises/bleeds easily.   Psychiatric/Behavioral: Negative.  Negative for sleep disturbance.        Objective   Vitals:    12/16/24 1024   BP: 145/81   Pulse: 67   SpO2: 99%   Weight: 59.9 kg (132 lb)   Height: 157.5 cm (62\")      /81   Pulse 67   Ht 157.5 cm (62\")   Wt 59.9 kg (132 lb)   SpO2 99%   BMI 24.14 kg/m²    Lab Results (most recent)       None          Physical Exam  Vitals and nursing note reviewed.   Constitutional:       General: She is not in acute distress.     Appearance: She is well-developed.   HENT:      Head: Normocephalic and atraumatic.   Eyes:      Conjunctiva/sclera: Conjunctivae normal.      Pupils: Pupils are equal, round, and reactive to light.   Neck:      Vascular: No JVD.      Trachea: No tracheal deviation.   Cardiovascular:      Rate and Rhythm: Normal rate and regular rhythm.      Heart sounds: Normal heart sounds.   Pulmonary:      Effort: Pulmonary effort is normal.      Breath sounds: Normal breath sounds.   Abdominal:      General: Bowel sounds are normal. There is no distension.      Palpations: Abdomen is soft. There is no mass.      Tenderness: There is no abdominal tenderness. There is no guarding " or rebound.   Musculoskeletal:         General: No tenderness or deformity. Normal range of motion.      Cervical back: Normal range of motion and neck supple.   Skin:     General: Skin is warm and dry.      Coloration: Skin is not pale.      Findings: No erythema or rash.   Neurological:      Mental Status: She is alert and oriented to person, place, and time.   Psychiatric:         Behavior: Behavior normal.         Thought Content: Thought content normal.         Judgment: Judgment normal.           Procedure   Procedures       Assessment & Plan      Diagnosis Plan   1. Coronary artery disease involving native coronary artery of native heart without angina pectoris        2. Shortness of breath        3. Essential hypertension            1.  At this time, the patient appears to be doing well from general cardiovascular standpoint.  She denies angina.  Dyspnea is at baseline.  She has no further cardiovascular symptoms or issues.    2.  She is mostly normotensive when monitored at home.  She will follow this and call for any ongoing issues.    3.  As the patient appears stable, nothing further.  No adjustments in medications will be made.  We will see the patient on routine 6 to 9-month intervals.         Advance Care Planning   ACP discussion was held with the patient during this visit. Patient has an advance directive (not in EMR), copy requested.           Electronically signed by:

## 2025-01-12 RX ORDER — ROSUVASTATIN CALCIUM 10 MG/1
10 TABLET, COATED ORAL DAILY
Qty: 90 TABLET | Refills: 3 | Status: SHIPPED | OUTPATIENT
Start: 2025-01-12

## 2025-02-10 RX ORDER — EZETIMIBE 10 MG/1
TABLET ORAL
Qty: 90 TABLET | Refills: 0 | Status: SHIPPED | OUTPATIENT
Start: 2025-02-10

## 2025-03-17 LAB
NCCN CRITERIA FLAG: NORMAL
TYRER CUZICK SCORE: 3

## 2025-03-19 ENCOUNTER — HOSPITAL ENCOUNTER (OUTPATIENT)
Dept: MAMMOGRAPHY | Facility: HOSPITAL | Age: 70
Discharge: HOME | End: 2025-03-19
Admitting: NURSE PRACTITIONER
Payer: MEDICARE

## 2025-03-19 DIAGNOSIS — Z12.31 SCREENING MAMMOGRAM FOR BREAST CANCER: ICD-10-CM

## 2025-03-19 PROCEDURE — 77067 SCR MAMMO BI INCL CAD: CPT

## 2025-03-19 PROCEDURE — 77063 BREAST TOMOSYNTHESIS BI: CPT

## 2025-05-13 RX ORDER — EZETIMIBE 10 MG/1
TABLET ORAL
Qty: 90 TABLET | Refills: 0 | Status: SHIPPED | OUTPATIENT
Start: 2025-05-13

## 2025-08-20 ENCOUNTER — OFFICE VISIT (OUTPATIENT)
Dept: CARDIOLOGY | Facility: CLINIC | Age: 70
End: 2025-08-20
Payer: MEDICARE

## 2025-08-20 VITALS
DIASTOLIC BLOOD PRESSURE: 77 MMHG | WEIGHT: 124.6 LBS | SYSTOLIC BLOOD PRESSURE: 131 MMHG | OXYGEN SATURATION: 95 % | HEIGHT: 62 IN | BODY MASS INDEX: 22.93 KG/M2 | HEART RATE: 71 BPM

## 2025-08-20 DIAGNOSIS — R06.02 SHORTNESS OF BREATH: ICD-10-CM

## 2025-08-20 DIAGNOSIS — I25.10 CORONARY ARTERY DISEASE INVOLVING NATIVE CORONARY ARTERY OF NATIVE HEART, UNSPECIFIED WHETHER ANGINA PRESENT: Primary | ICD-10-CM

## 2025-08-20 DIAGNOSIS — I10 ESSENTIAL HYPERTENSION: ICD-10-CM

## 2025-08-20 PROCEDURE — 3078F DIAST BP <80 MM HG: CPT | Performed by: PHYSICIAN ASSISTANT

## 2025-08-20 PROCEDURE — 99214 OFFICE O/P EST MOD 30 MIN: CPT | Performed by: PHYSICIAN ASSISTANT

## 2025-08-20 PROCEDURE — 3075F SYST BP GE 130 - 139MM HG: CPT | Performed by: PHYSICIAN ASSISTANT

## 2025-08-20 PROCEDURE — 1159F MED LIST DOCD IN RCRD: CPT | Performed by: PHYSICIAN ASSISTANT

## 2025-08-20 PROCEDURE — 93000 ELECTROCARDIOGRAM COMPLETE: CPT | Performed by: PHYSICIAN ASSISTANT

## 2025-08-20 PROCEDURE — 1160F RVW MEDS BY RX/DR IN RCRD: CPT | Performed by: PHYSICIAN ASSISTANT

## 2025-08-20 RX ORDER — MELOXICAM 7.5 MG/1
7.5 TABLET ORAL DAILY
COMMUNITY
Start: 2025-08-07

## 2025-08-20 RX ORDER — UPADACITINIB 15 MG/1
15 TABLET, EXTENDED RELEASE ORAL DAILY
COMMUNITY
Start: 2025-08-07